# Patient Record
Sex: MALE | Race: WHITE | Employment: PART TIME | URBAN - METROPOLITAN AREA
[De-identification: names, ages, dates, MRNs, and addresses within clinical notes are randomized per-mention and may not be internally consistent; named-entity substitution may affect disease eponyms.]

---

## 2020-03-07 ENCOUNTER — APPOINTMENT (OUTPATIENT)
Dept: GENERAL RADIOLOGY | Age: 55
End: 2020-03-07
Attending: EMERGENCY MEDICINE
Payer: COMMERCIAL

## 2020-03-07 ENCOUNTER — HOSPITAL ENCOUNTER (EMERGENCY)
Age: 55
Discharge: HOME | End: 2020-03-07
Attending: EMERGENCY MEDICINE
Payer: COMMERCIAL

## 2020-03-07 VITALS
HEIGHT: 74 IN | RESPIRATION RATE: 18 BRPM | TEMPERATURE: 98.6 F | HEART RATE: 75 BPM | OXYGEN SATURATION: 100 % | BODY MASS INDEX: 34.77 KG/M2 | WEIGHT: 270.95 LBS | SYSTOLIC BLOOD PRESSURE: 135 MMHG | DIASTOLIC BLOOD PRESSURE: 82 MMHG

## 2020-03-07 DIAGNOSIS — M77.02 MEDIAL EPICONDYLITIS OF LEFT ELBOW: Primary | ICD-10-CM

## 2020-03-07 PROCEDURE — 99283 EMERGENCY DEPT VISIT LOW MDM: CPT

## 2020-03-07 PROCEDURE — 73080 X-RAY EXAM OF ELBOW: CPT

## 2020-03-07 RX ORDER — HYDROMORPHONE HYDROCHLORIDE 4 MG/1
4 TABLET ORAL
COMMUNITY

## 2020-03-07 RX ORDER — WARFARIN SODIUM 5 MG/1
5 TABLET ORAL
COMMUNITY

## 2020-03-07 RX ORDER — CARBIDOPA AND LEVODOPA 25; 100 MG/1; MG/1
2 TABLET ORAL
COMMUNITY

## 2020-03-07 RX ORDER — ALPRAZOLAM 0.25 MG/1
0.75 TABLET ORAL
COMMUNITY

## 2020-03-07 RX ORDER — ESOMEPRAZOLE MAGNESIUM 40 MG/1
40 CAPSULE, DELAYED RELEASE ORAL DAILY
COMMUNITY

## 2020-03-07 RX ORDER — ZOLPIDEM TARTRATE 10 MG/1
20 TABLET ORAL
COMMUNITY

## 2020-03-07 RX ORDER — TIZANIDINE HYDROCHLORIDE 4 MG/1
16 CAPSULE, GELATIN COATED ORAL
COMMUNITY

## 2020-03-08 NOTE — ED PROVIDER NOTES
The patient presents to the emergency department with left elbow pain. He has had pain for a week. He had a ground-level fall, and reports falling onto his left elbow approximately 1 week ago. He is right-handed. Since his fall, he has had pain to the medial elbow. The pain is increased when he tries to pick something up with his left hand. He denies any weakness of his hand or his arm, but states that when he tries to lift something the pain is so bad that he drops it. He has been taking his Dilaudid which she has prescribed for his chronic leg pain with no relief. Pain is currently 10 out of 10, and described as stabbing. He denies any other injuries or complaints. ORTHO: None    The history is provided by the patient and the spouse. Elbow Pain           Past Medical History:   Diagnosis Date    Acid reflux     Baker's cyst     DVT (deep venous thrombosis) (HCC)        Past Surgical History:   Procedure Laterality Date    HX ORTHOPAEDIC      multiple surgeries to his left leg    IR DILATE ESOPH STRICT      IR IVC FILTER           History reviewed. No pertinent family history. Social History     Socioeconomic History    Marital status:      Spouse name: Not on file    Number of children: Not on file    Years of education: Not on file    Highest education level: Not on file   Occupational History    Not on file   Social Needs    Financial resource strain: Not on file    Food insecurity:     Worry: Not on file     Inability: Not on file    Transportation needs:     Medical: Not on file     Non-medical: Not on file   Tobacco Use    Smoking status: Never Smoker    Smokeless tobacco: Never Used   Substance and Sexual Activity    Alcohol use:  Yes     Alcohol/week: 12.0 standard drinks     Types: 12 Cans of beer per week    Drug use: Not on file    Sexual activity: Not on file   Lifestyle    Physical activity:     Days per week: Not on file     Minutes per session: Not on file  Stress: Not on file   Relationships    Social connections:     Talks on phone: Not on file     Gets together: Not on file     Attends Cheondo[employer] service: Not on file     Active member of club or organization: Not on file     Attends meetings of clubs or organizations: Not on file     Relationship status: Not on file    Intimate partner violence:     Fear of current or ex partner: Not on file     Emotionally abused: Not on file     Physically abused: Not on file     Forced sexual activity: Not on file   Other Topics Concern    Not on file   Social History Narrative    Not on file         ALLERGIES: Pcn [penicillins]    Review of Systems   Constitutional: Negative for appetite change and fever. HENT: Negative for congestion, nosebleeds and sore throat. Eyes: Negative for discharge and visual disturbance. Respiratory: Negative for cough and shortness of breath. Cardiovascular: Negative for chest pain. Gastrointestinal: Negative for abdominal pain, diarrhea, nausea and vomiting. Genitourinary: Negative for dysuria. Musculoskeletal:        L elbow pain   Skin: Negative for rash. Neurological: Negative for weakness and headaches. Hematological: Negative for adenopathy. Psychiatric/Behavioral: Negative. All other systems reviewed and are negative. Vitals:    03/07/20 2119 03/07/20 2225   BP: (!) 148/99 135/82   Pulse: 84 75   Resp: 18 18   Temp: 99 °F (37.2 °C) 98.6 °F (37 °C)   SpO2: 100% 100%   Weight: 122.9 kg (270 lb 15.1 oz)    Height: 6' 2\" (1.88 m)             Physical Exam  Vitals signs and nursing note reviewed. Constitutional:       Appearance: Normal appearance. He is well-developed. HENT:      Head: Normocephalic and atraumatic. Right Ear: External ear normal.      Nose: Nose normal.      Mouth/Throat:      Mouth: Mucous membranes are moist.   Eyes:      Conjunctiva/sclera: Conjunctivae normal.      Pupils: Pupils are equal, round, and reactive to light.    Neck: Musculoskeletal: Normal range of motion and neck supple. Cardiovascular:      Rate and Rhythm: Normal rate and regular rhythm. Heart sounds: Normal heart sounds. Pulmonary:      Effort: Pulmonary effort is normal.      Breath sounds: Normal breath sounds. Abdominal:      General: Bowel sounds are normal.      Palpations: Abdomen is soft. Tenderness: There is no abdominal tenderness. Musculoskeletal: Normal range of motion. General: Tenderness present. Comments: L elbow - TTP over the medial elbow. Pain with ROM of the elbow. No pain with pronation or supination of the forearm. Full range of motion of the wrist.  Equal . Neurovascularly intact. Skin:     General: Skin is warm and dry. Capillary Refill: Capillary refill takes less than 2 seconds. Neurological:      General: No focal deficit present. Mental Status: He is alert and oriented to person, place, and time. Psychiatric:         Behavior: Behavior normal.          MDM       Procedures      A/P:  1. Medial epicondylitis - counterforce brace. History of acetaminophen to his already prescribed Dilaudid. Follow-up with orthopedics. He is on Coumadin, and therefore cannot take NSAIDs. Patient's results have been reviewed with them. Patient and/or family have verbally conveyed their understanding and agreement of the patient's signs, symptoms, diagnosis, treatment and prognosis and additionally agree to follow up as recommended or return to the Emergency Room should their condition change prior to follow-up. Discharge instructions have also been provided to the patient with some educational information regarding their diagnosis as well a list of reasons why they would want to return to the ER prior to their follow-up appointment should their condition change.

## 2020-03-08 NOTE — ED TRIAGE NOTES
Triage Note: Patient arrives to ER complaining of left elbow pain. Patient states he fell about a week ago and has had pain to his elbow since then. States he is on dilaudid for chronic leg pain and that has not helped with his elbow pain. Full ROM at elbow but states when he tries to lift something heavy his elbow gives out and he drops it.

## 2020-03-08 NOTE — DISCHARGE INSTRUCTIONS
- Please purchase a counterforce brace or a compression sleeve. - Ice your elbow frequently - especially after activity. - Add Acetaminophen 650 mg every 6 hrs as needed for pain. - Please follow-up with Dr. Lisa Thompson.  - Return to ED for ANY concerns. Patient Education        Golmarge's Elbow: Care Instructions  Your Care Instructions  The pain and soreness in the inner part of your elbow is caused by a problem called golmarge's elbow. Bending the wrist over and over again has hurt the tendons that attach to your inner elbow. The muscles in your forearm also may hurt. Sophy's elbow usually gets better with treatment at home. Follow-up care is a key part of your treatment and safety. Be sure to make and go to all appointments, and call your doctor if you are having problems. It's also a good idea to know your test results and keep a list of the medicines you take. How can you care for yourself at home? · Rest your elbow and wrist. Try to avoid movements that are painful. You may have to do this for weeks to months. Follow your doctor's directions for how long to rest.  · Put ice or a cold pack on your elbow for 10 to 20 minutes at a time. Try to do this every 1 to 2 hours for the next 3 days (when you are awake) or until the swelling goes down. Put a thin cloth between the ice and your skin. · Prop up the sore arm on a pillow when you ice it or anytime you sit or lie down during the next 3 days. Try to keep it above the level of your heart. This will help reduce swelling. · Take pain medicine exactly as directed. ? If the doctor gave you a prescription medicine for pain, take it as prescribed. ? If you are not taking a prescription pain medicine, ask your doctor if you can take an over-the-counter medicine. · If your doctor gave you a brace or splint, use it as directed. A \"counterforce\" brace is a strap around the forearm, just below the elbow.  It may ease the pressure on the tendon and may spread force throughout the arm. · Follow your doctor's or physical therapist's directions for exercise. To prevent golfer's elbow  · After your elbow has healed, learn the best techniques for your work or sport. A physical or occupational therapist can help you. When should you call for help? Call your doctor now or seek immediate medical care if:    · Your pain gets worse.     · You cannot bend your elbow normally.     · You have tingling, weakness, or numbness in your hand and fingers.     · Your arm or hand is cool or pale or changes color.    Watch closely for changes in your health, and be sure to contact your doctor if:    · You have work problems caused by your elbow pain.     · Your pain is not better after 2 weeks. Where can you learn more? Go to http://jermain-benigno.info/. Enter Y420 in the search box to learn more about \"Golfer's Elbow: Care Instructions. \"  Current as of: June 26, 2019  Content Version: 12.2  © 4456-1275 [employer]. Care instructions adapted under license by [employer] (which disclaims liability or warranty for this information). If you have questions about a medical condition or this instruction, always ask your healthcare professional. [address] any warranty or liability for your use of this information. Patient Education        Golfer's Elbow: Exercises  Introduction  Here are some examples of exercises for you to try. The exercises may be suggested for a condition or for rehabilitation. Start each exercise slowly. Ease off the exercises if you start to have pain. You will be told when to start these exercises and which ones will work best for you. How to do the exercises  Wrist extensor stretch    1. Extend your affected arm in front of you and make a fist with your palm facing down. 2. Bend your wrist so that your fist points toward the floor.   3. With your other hand, gently bend your wrist farther until you feel a mild to moderate stretch in your forearm. 4. Hold for at least 15 to 30 seconds. 5. Repeat 2 to 4 times. 6. Repeat steps 1 through 5 with your fingers pointing toward the floor. Forearm extensor stretch    1. Place your affected elbow down at your side, bent at about 90 degrees. Then make a fist with your palm facing down. 2. Keeping your wrist bent, slowly straighten your elbow so your arm is down at your side. Then twist your fist out so your palm is facing out to the side and you feel a stretch. 3. Hold for at least 15 to 30 seconds. 4. Repeat 2 to 4 times. Wrist flexor stretch    1. Extend your affected arm in front of you with your palm facing away from your body. 2. Bend back your wrist, pointing your hand up toward the ceiling. 3. With your other hand, gently bend your wrist farther until you feel a mild to moderate stretch in your forearm. 4. Hold for at least 15 to 30 seconds. 5. Repeat 2 to 4 times. 6. Repeat steps 1 through 5, but this time extend your affected arm in front of you with your palm facing up. Then bend back your wrist, pointing your hand toward the floor. Wrist curls    1. Place your forearm on a table with your hand hanging over the edge of the table, palm up. 2. Place a 1- to 2-pound weight in your hand. This may be a dumbbell, a can of food, or a filled water bottle. 3. Slowly raise and lower the weight while keeping your forearm on the table and your palm facing up. 4. Repeat this motion 8 to 12 times. 5. Switch arms, and do steps 1 through 4.  6. Repeat with your hand facing down toward the floor. Switch arms. Resisted wrist extension    1. Sit leaning forward with your legs slightly spread. Then place your affected forearm on your thigh with your hand and wrist in front of your knee.   2. Grasp one end of an exercise band with your palm down, and step on the other end.  3. Slowly bend your wrist upward for a count of 2, then lower your wrist slowly to a count of 5.  4. Repeat 8 to 12 times. Resisted wrist flexion    1. Sit leaning forward with your legs slightly spread. Then place your affected forearm on your thigh with your hand and wrist in front of your knee. 2. Grasp one end of an exercise band with your palm up, and step on the other end.  3. Slowly bend your wrist upward for a count of 2, then lower your wrist slowly to a count of 5.  4. Repeat 8 to 12 times. Neck stretch to the side    1. This stretch works best if you keep your shoulder down as you lean away from it. To help you remember to do this, start by relaxing your shoulders and lightly holding on to your thighs or your chair. 2. Tilt your head away from your affected elbow and toward your opposite shoulder. For example, if your right elbow is sore, keep your right shoulder down as you lean your head toward your left shoulder. 3. Hold for 15 to 30 seconds. Let the weight of your head stretch your muscles. 4. If you would like a little added stretch, use your hand to gently and steadily pull your head toward your shoulder. For example, if your right elbow is sore, use your left hand to gently pull your head toward your left shoulder. 5. Repeat 2 to 4 times. Resisted forearm pronation    1. Sit leaning forward with your legs slightly spread. Then place your affected forearm on your thigh with your hand and wrist in front of your knee. 2. Grasp one end of an exercise band with your palm up, and step on the other end. 3. Keeping your wrist straight, roll your palm inward toward your thigh for a count of 2, then slowly move your wrist back to the starting position to a count of 5.  4. Repeat 8 to 12 times. Resisted supination    1. Sit leaning forward with your legs slightly spread. Then place your affected forearm on your thigh with your hand and wrist in front of your knee.   2. Grasp one end of an exercise band with your palm down, and step on the other end.  3. Keeping your wrist straight, roll your palm outward and away from your thigh for a count of 2, then slowly move your wrist back to the starting position to a count of 5.  4. Repeat 8 to 12 times. Follow-up care is a key part of your treatment and safety. Be sure to make and go to all appointments, and call your doctor if you are having problems. It's also a good idea to know your test results and keep a list of the medicines you take. Where can you learn more? Go to http://jermain-benigno.info/. Enter [phone] [phone] in the search box to learn more about \"Golfer's Elbow: Exercises. \"  Current as of: June 26, 2019  Content Version: 12.2  © 0681-2912 [employer]. Care instructions adapted under license by [employer] (which disclaims liability or warranty for this information). If you have questions about a medical condition or this instruction, always ask your healthcare professional. [address] any warranty or liability for your use of this information.

## 2020-04-15 ENCOUNTER — APPOINTMENT (OUTPATIENT)
Dept: GENERAL RADIOLOGY | Age: 55
End: 2020-04-15
Attending: EMERGENCY MEDICINE
Payer: COMMERCIAL

## 2020-04-15 ENCOUNTER — APPOINTMENT (OUTPATIENT)
Dept: CT IMAGING | Age: 55
End: 2020-04-15
Attending: EMERGENCY MEDICINE
Payer: COMMERCIAL

## 2020-04-15 ENCOUNTER — HOSPITAL ENCOUNTER (EMERGENCY)
Age: 55
Discharge: HOME | End: 2020-04-15
Attending: EMERGENCY MEDICINE
Payer: COMMERCIAL

## 2020-04-15 VITALS
SYSTOLIC BLOOD PRESSURE: 146 MMHG | WEIGHT: 263.67 LBS | OXYGEN SATURATION: 93 % | TEMPERATURE: 98.1 F | BODY MASS INDEX: 33.84 KG/M2 | RESPIRATION RATE: 16 BRPM | HEART RATE: 79 BPM | DIASTOLIC BLOOD PRESSURE: 83 MMHG | HEIGHT: 74 IN

## 2020-04-15 DIAGNOSIS — J01.40 ACUTE PANSINUSITIS, RECURRENCE NOT SPECIFIED: Primary | ICD-10-CM

## 2020-04-15 DIAGNOSIS — R79.1 SUBTHERAPEUTIC INTERNATIONAL NORMALIZED RATIO (INR): ICD-10-CM

## 2020-04-15 DIAGNOSIS — R53.83 FATIGUE, UNSPECIFIED TYPE: ICD-10-CM

## 2020-04-15 LAB
ALBUMIN SERPL-MCNC: 3.9 G/DL (ref 3.5–5)
ALBUMIN/GLOB SERPL: 1 {RATIO} (ref 1.1–2.2)
ALP SERPL-CCNC: 85 U/L (ref 45–117)
ALT SERPL-CCNC: 23 U/L (ref 12–78)
ANION GAP SERPL CALC-SCNC: 8 MMOL/L (ref 5–15)
AST SERPL-CCNC: 18 U/L (ref 15–37)
ATRIAL RATE: 71 BPM
BASOPHILS # BLD: 0 K/UL (ref 0–0.1)
BASOPHILS NFR BLD: 0 % (ref 0–1)
BILIRUB SERPL-MCNC: 0.4 MG/DL (ref 0.2–1)
BUN SERPL-MCNC: 11 MG/DL (ref 6–20)
BUN/CREAT SERPL: 10 (ref 12–20)
CALCIUM SERPL-MCNC: 8.4 MG/DL (ref 8.5–10.1)
CALCULATED P AXIS, ECG09: 18 DEGREES
CALCULATED R AXIS, ECG10: 31 DEGREES
CALCULATED T AXIS, ECG11: 27 DEGREES
CHLORIDE SERPL-SCNC: 105 MMOL/L (ref 97–108)
CO2 SERPL-SCNC: 30 MMOL/L (ref 21–32)
COMMENT, HOLDF: NORMAL
CREAT SERPL-MCNC: 1.05 MG/DL (ref 0.7–1.3)
DIAGNOSIS, 93000: NORMAL
DIFFERENTIAL METHOD BLD: NORMAL
EOSINOPHIL # BLD: 0.4 K/UL (ref 0–0.4)
EOSINOPHIL NFR BLD: 4 % (ref 0–7)
ERYTHROCYTE [DISTWIDTH] IN BLOOD BY AUTOMATED COUNT: 13.9 % (ref 11.5–14.5)
GLOBULIN SER CALC-MCNC: 4.1 G/DL (ref 2–4)
GLUCOSE SERPL-MCNC: 96 MG/DL (ref 65–100)
HCT VFR BLD AUTO: 43.7 % (ref 36.6–50.3)
HGB BLD-MCNC: 13.4 G/DL (ref 12.1–17)
IMM GRANULOCYTES # BLD AUTO: 0 K/UL (ref 0–0.04)
IMM GRANULOCYTES NFR BLD AUTO: 0 % (ref 0–0.5)
INR PPP: 1.1 (ref 0.9–1.1)
LYMPHOCYTES # BLD: 3.1 K/UL (ref 0.8–3.5)
LYMPHOCYTES NFR BLD: 35 % (ref 12–49)
MAGNESIUM SERPL-MCNC: 2 MG/DL (ref 1.6–2.4)
MCH RBC QN AUTO: 27.4 PG (ref 26–34)
MCHC RBC AUTO-ENTMCNC: 30.7 G/DL (ref 30–36.5)
MCV RBC AUTO: 89.4 FL (ref 80–99)
MONOCYTES # BLD: 0.8 K/UL (ref 0–1)
MONOCYTES NFR BLD: 9 % (ref 5–13)
NEUTS SEG # BLD: 4.6 K/UL (ref 1.8–8)
NEUTS SEG NFR BLD: 52 % (ref 32–75)
NRBC # BLD: 0 K/UL (ref 0–0.01)
NRBC BLD-RTO: 0 PER 100 WBC
P-R INTERVAL, ECG05: 192 MS
PHOSPHATE SERPL-MCNC: 3.1 MG/DL (ref 2.6–4.7)
PLATELET # BLD AUTO: 273 K/UL (ref 150–400)
PMV BLD AUTO: 10 FL (ref 8.9–12.9)
POTASSIUM SERPL-SCNC: 3.8 MMOL/L (ref 3.5–5.1)
PROT SERPL-MCNC: 8 G/DL (ref 6.4–8.2)
PROTHROMBIN TIME: 10.6 SEC (ref 9–11.1)
Q-T INTERVAL, ECG07: 388 MS
QRS DURATION, ECG06: 94 MS
QTC CALCULATION (BEZET), ECG08: 421 MS
RBC # BLD AUTO: 4.89 M/UL (ref 4.1–5.7)
SAMPLES BEING HELD,HOLD: NORMAL
SODIUM SERPL-SCNC: 143 MMOL/L (ref 136–145)
TROPONIN I SERPL-MCNC: <0.05 NG/ML
TSH SERPL DL<=0.05 MIU/L-ACNC: 1.27 UIU/ML (ref 0.36–3.74)
VENTRICULAR RATE, ECG03: 71 BPM
WBC # BLD AUTO: 8.8 K/UL (ref 4.1–11.1)

## 2020-04-15 PROCEDURE — 84100 ASSAY OF PHOSPHORUS: CPT

## 2020-04-15 PROCEDURE — 99284 EMERGENCY DEPT VISIT MOD MDM: CPT

## 2020-04-15 PROCEDURE — 36415 COLL VENOUS BLD VENIPUNCTURE: CPT

## 2020-04-15 PROCEDURE — 85025 COMPLETE CBC W/AUTO DIFF WBC: CPT

## 2020-04-15 PROCEDURE — 85610 PROTHROMBIN TIME: CPT

## 2020-04-15 PROCEDURE — 84484 ASSAY OF TROPONIN QUANT: CPT

## 2020-04-15 PROCEDURE — 74011250637 HC RX REV CODE- 250/637: Performed by: EMERGENCY MEDICINE

## 2020-04-15 PROCEDURE — 74011250636 HC RX REV CODE- 250/636: Performed by: EMERGENCY MEDICINE

## 2020-04-15 PROCEDURE — 83735 ASSAY OF MAGNESIUM: CPT

## 2020-04-15 PROCEDURE — 71046 X-RAY EXAM CHEST 2 VIEWS: CPT

## 2020-04-15 PROCEDURE — 70450 CT HEAD/BRAIN W/O DYE: CPT

## 2020-04-15 PROCEDURE — 80053 COMPREHEN METABOLIC PANEL: CPT

## 2020-04-15 PROCEDURE — 84443 ASSAY THYROID STIM HORMONE: CPT

## 2020-04-15 PROCEDURE — 93005 ELECTROCARDIOGRAM TRACING: CPT

## 2020-04-15 RX ORDER — DOXYCYCLINE HYCLATE 100 MG
100 TABLET ORAL 2 TIMES DAILY
Qty: 20 TAB | Refills: 0 | Status: SHIPPED | OUTPATIENT
Start: 2020-04-15 | End: 2020-04-25

## 2020-04-15 RX ORDER — DOXYCYCLINE HYCLATE 100 MG
100 TABLET ORAL
Status: COMPLETED | OUTPATIENT
Start: 2020-04-15 | End: 2020-04-15

## 2020-04-15 RX ADMIN — SODIUM CHLORIDE 1000 ML: 900 INJECTION, SOLUTION INTRAVENOUS at 00:45

## 2020-04-15 RX ADMIN — DOXYCYCLINE HYCLATE 100 MG: 100 TABLET, COATED ORAL at 01:25

## 2020-04-15 NOTE — ED TRIAGE NOTES
Pt claims he has been feels tired. Falling asleep while talking. Also c/o of feeling agitated. Working every day on farm. Speech clear. Nad. Claims he felt this way a long time ago and had lyme's disease. Gait steady. Nad. Pt also added he is having difficulties sleeping at night because of his anxiety. Also claims he had similar s/s with a dx of pneumonia several years ago. Chronic use of rx dilaudid for leg injury 2006.

## 2020-04-15 NOTE — DISCHARGE INSTRUCTIONS
- Doxycycline as prescribed. - Sinus rinses twice a day. - Please begin nasal Flonase and Allegra daily.  - Follow-up with Dr. Dayami Rene - ENT. - Please call Dr. Roya Heller office first thing in the AM. Let him know your INR (ie Coumadin) level was only 1.1. Adjust your Coumadin dose as advised. - Return to ED for any concerns. Patient Education        Sinusitis: Care Instructions  Your Care Instructions    Sinusitis is an infection of the lining of the sinus cavities in your head. Sinusitis often follows a cold. It causes pain and pressure in your head and face. In most cases, sinusitis gets better on its own in 1 to 2 weeks. But some mild symptoms may last for several weeks. Sometimes antibiotics are needed. Follow-up care is a key part of your treatment and safety. Be sure to make and go to all appointments, and call your doctor if you are having problems. It's also a good idea to know your test results and keep a list of the medicines you take. How can you care for yourself at home? · Take an over-the-counter pain medicine, such as acetaminophen (Tylenol), ibuprofen (Advil, Motrin), or naproxen (Aleve). Read and follow all instructions on the label. · If the doctor prescribed antibiotics, take them as directed. Do not stop taking them just because you feel better. You need to take the full course of antibiotics. · Be careful when taking over-the-counter cold or flu medicines and Tylenol at the same time. Many of these medicines have acetaminophen, which is Tylenol. Read the labels to make sure that you are not taking more than the recommended dose. Too much acetaminophen (Tylenol) can be harmful. · Breathe warm, moist air from a steamy shower, a hot bath, or a sink filled with hot water. Avoid cold, dry air. Using a humidifier in your home may help. Follow the directions for cleaning the machine. · Use saline (saltwater) nasal washes to help keep your nasal passages open and wash out mucus and bacteria. You can buy saline nose drops at a grocery store or drugstore. Or you can make your own at home by adding 1 teaspoon of salt and 1 teaspoon of baking soda to 2 cups of distilled water. If you make your own, fill a bulb syringe with the solution, insert the tip into your nostril, and squeeze gently. Ahmed Hamburger your nose. · Put a hot, wet towel or a warm gel pack on your face 3 or 4 times a day for 5 to 10 minutes each time. · Try a decongestant nasal spray like oxymetazoline (Afrin). Do not use it for more than 3 days in a row. Using it for more than 3 days can make your congestion worse. When should you call for help? Call your doctor now or seek immediate medical care if:    · You have new or worse swelling or redness in your face or around your eyes.     · You have a new or higher fever.    Watch closely for changes in your health, and be sure to contact your doctor if:    · You have new or worse facial pain.     · The mucus from your nose becomes thicker (like pus) or has new blood in it.     · You are not getting better as expected. Where can you learn more? Go to http://jermain-benigno.info/  Enter K841 in the search box to learn more about \"Sinusitis: Care Instructions. \"  Current as of: July 28, 2019Content Version: 12.4  © 2546-5402 [employer]. Care instructions adapted under license by [employer] (which disclaims liability or warranty for this information). If you have questions about a medical condition or this instruction, always ask your healthcare professional. [place] [address] any warranty or liability for your use of this information. Patient Education        Saline Nasal Washes: Care Instructions  Your Care Instructions  Saline nasal washes help keep the nasal passages open by washing out thick or dried mucus. This simple remedy can help relieve symptoms of allergies, sinusitis, and colds.  It also can make the nose feel more comfortable by keeping the mucous membranes moist. You may notice a little burning sensation in your nose the first few times you use the solution, but this usually gets better in a few days. Follow-up care is a key part of your treatment and safety. Be sure to make and go to all appointments, and call your doctor if you are having problems. It's also a good idea to know your test results and keep a list of the medicines you take. How can you care for yourself at home? · You can buy premixed saline solution in a squeeze bottle or other sinus rinse products at a drugstore. Read and follow the instructions on the label. · You also can make your own saline solution by adding 1 teaspoon of salt and 1 teaspoon of baking soda to 2 cups of distilled water. · If you use a homemade solution, pour a small amount into a clean bowl. Using a rubber bulb syringe, squeeze the syringe and place the tip in the salt water. Pull a small amount of the salt water into the syringe by relaxing your hand. · Sit down with your head tilted slightly back. Do not lie down. Put the tip of the bulb syringe or the squeeze bottle a little way into one of your nostrils. Gently drip or squirt a few drops into the nostril. Repeat with the other nostril. Some sneezing and gagging are normal at first.  · Gently blow your nose. · Wipe the syringe or bottle tip clean after each use. · Repeat this 2 or 3 times a day. · Use nasal washes gently if you have nosebleeds often. When should you call for help? Watch closely for changes in your health, and be sure to contact your doctor if:    · You often get nosebleeds.     · You have problems doing the nasal washes. Where can you learn more? Go to http://jermain-benigno.info/  Enter B784 in the search box to learn more about \"Saline Nasal Washes: Care Instructions. \"  Current as of: July 28, 2019Content Version: 12.4  © 6616-7683 [employer].   Care instructions adapted under license by [employer] (which disclaims liability or warranty for this information). If you have questions about a medical condition or this instruction, always ask your healthcare professional. [address] any warranty or liability for your use of this information. Patient Education        Fatigue: Care Instructions  Your Care Instructions    Fatigue is a feeling of tiredness, exhaustion, or lack of energy. You may feel fatigue because of too much or not enough activity. It can also come from stress, lack of sleep, boredom, and poor diet. Many medical problems, such as viral infections, can cause fatigue. Emotional problems, especially depression, are often the cause of fatigue. Fatigue is most often a symptom of another problem. Treatment for fatigue depends on the cause. For example, if you have fatigue because you have a certain health problem, treating this problem also treats your fatigue. If depression or anxiety is the cause, treatment may help. Follow-up care is a key part of your treatment and safety. Be sure to make and go to all appointments, and call your doctor if you are having problems. It's also a good idea to know your test results and keep a list of the medicines you take. How can you care for yourself at home? · Get regular exercise. But don't overdo it. Go back and forth between rest and exercise. · Get plenty of rest.  · Eat a healthy diet. Do not skip meals, especially breakfast.  · Reduce your use of caffeine, tobacco, and alcohol. Caffeine is most often found in coffee, tea, cola drinks, and chocolate. · Limit medicines that can cause fatigue. This includes tranquilizers and cold and allergy medicines. When should you call for help?   Watch closely for changes in your health, and be sure to contact your doctor if:    · You have new symptoms such as fever or a rash.     · Your fatigue gets worse.     · You have been feeling down, depressed, or hopeless. Or you may have lost interest in things that you usually enjoy.     · You are not getting better as expected. Where can you learn more? Go to http://jermain-benigno.info/  Enter [id number] in the search box to learn more about \"Fatigue: Care Instructions. \"  Current as of: June 26, 2019Content Version: 12.4  © 4486-6298 [employer]. Care instructions adapted under license by [employer] (which disclaims liability or warranty for this information). If you have questions about a medical condition or this instruction, always ask your healthcare professional. [place] [address] any warranty or liability for your use of this information.

## 2020-04-15 NOTE — ED NOTES
Orthostatics negative, patient denies dizziness. Patient ambulated with RN and radiology tech to xray with portable pulse ox, 02 sat 96-98% RA, HR noted to be 120's, MD aware.

## 2020-04-15 NOTE — ED NOTES
Patient  given copy of dc instructions  Patient  verbalized understanding of the importance of discussing medications with  his or her physician or clinic they will be following up with. Patient alert and oriented and in no acute distress. Patient discharged home ambulatory with family member.

## 2020-04-15 NOTE — ED PROVIDER NOTES
Patient presents to the emergency department with multiple concerns. He began to feel ill 2 nights ago. He reports having some confusion. He also reports he has had multiple episodes of when he stands up when he is already tired he falls asleep. He has fallen down during these episodes. He denies hitting his head. He has not had any fever. He feels that the symptoms may be related to mold spores on the farm. He works on a cattle farm. He reports nasal congestion. He denies any headache. He has had occasional shortness of breath when he is working, but the symptoms are not new. He denies any chest pain. He has no nausea, vomiting, diarrhea, or abdominal pain. He denies any difficulty speaking or swallowing he denies any known sick contacts. He has no other concerns or complaints. The history is provided by the patient. Physical   Pertinent negatives include no chest pain, no abdominal pain, no headaches and no shortness of breath. Past Medical History:   Diagnosis Date    Acid reflux     Anxiety     Baker's cyst     DVT (deep venous thrombosis) (HCC)     Restless leg     Restless leg syndrome        Past Surgical History:   Procedure Laterality Date    HX ORTHOPAEDIC      multiple surgeries to his left leg    IR DILATE ESOPH STRICT      IR IVC FILTER           History reviewed. No pertinent family history.     Social History     Socioeconomic History    Marital status:      Spouse name: Not on file    Number of children: Not on file    Years of education: Not on file    Highest education level: Not on file   Occupational History    Not on file   Social Needs    Financial resource strain: Not on file    Food insecurity     Worry: Not on file     Inability: Not on file    Transportation needs     Medical: Not on file     Non-medical: Not on file   Tobacco Use    Smoking status: Never Smoker    Smokeless tobacco: Never Used   Substance and Sexual Activity    Alcohol use: Yes     Alcohol/week: 12.0 standard drinks     Types: 12 Cans of beer per week    Drug use: Never    Sexual activity: Not on file   Lifestyle    Physical activity     Days per week: Not on file     Minutes per session: Not on file    Stress: Not on file   Relationships    Social connections     Talks on phone: Not on file     Gets together: Not on file     Attends Restorationism service: Not on file     Active member of club or organization: Not on file     Attends meetings of clubs or organizations: Not on file     Relationship status: Not on file    Intimate partner violence     Fear of current or ex partner: Not on file     Emotionally abused: Not on file     Physically abused: Not on file     Forced sexual activity: Not on file   Other Topics Concern    Not on file   Social History Narrative    Not on file         ALLERGIES: Pcn [penicillins]    Review of Systems   Constitutional: Positive for fatigue. Negative for appetite change and fever. HENT: Positive for congestion. Negative for nosebleeds and sore throat. Eyes: Negative for discharge. Respiratory: Negative for cough and shortness of breath. Cardiovascular: Negative for chest pain. Gastrointestinal: Negative for abdominal pain, diarrhea, nausea and vomiting. Genitourinary: Negative for dysuria. Musculoskeletal: Negative. Skin: Negative for rash. Allergic/Immunologic: Negative for immunocompromised state. Neurological: Positive for weakness. Negative for headaches. Hematological: Negative for adenopathy. Psychiatric/Behavioral: Negative. All other systems reviewed and are negative. Vitals:    04/15/20 0003 04/15/20 0048 04/15/20 0100   BP: (!) 178/99 (!) 169/94 146/83   Pulse: 79     Resp: 16     Temp: 98.1 °F (36.7 °C)     SpO2: 100%  93%   Weight: 119.6 kg (263 lb 10.7 oz)     Height: 6' 2\" (1.88 m)              Physical Exam  Vitals signs and nursing note reviewed.    Constitutional:       Appearance: Normal appearance. He is well-developed. HENT:      Head: Normocephalic and atraumatic. Right Ear: External ear normal.      Mouth/Throat:      Mouth: Mucous membranes are moist.   Eyes:      Conjunctiva/sclera: Conjunctivae normal.      Pupils: Pupils are equal, round, and reactive to light. Neck:      Musculoskeletal: Normal range of motion and neck supple. Cardiovascular:      Rate and Rhythm: Normal rate and regular rhythm. Pulses: Normal pulses. Heart sounds: Normal heart sounds. Pulmonary:      Effort: Pulmonary effort is normal.      Breath sounds: Normal breath sounds. Abdominal:      General: Abdomen is flat. Bowel sounds are normal.      Palpations: Abdomen is soft. Tenderness: There is no abdominal tenderness. Musculoskeletal: Normal range of motion. General: No tenderness. Skin:     General: Skin is warm and dry. Capillary Refill: Capillary refill takes less than 2 seconds. Neurological:      General: No focal deficit present. Mental Status: He is alert and oriented to person, place, and time. Cranial Nerves: No cranial nerve deficit. Sensory: No sensory deficit. Motor: No weakness. Gait: Gait normal.      Deep Tendon Reflexes: Abnormal reflex: .dx. Psychiatric:         Mood and Affect: Mood normal.         Behavior: Behavior normal.          MDM       Procedures    ED EKG interpretation:  Rhythm: normal sinus rhythm; and regular . Rate (approx.): 70; Axis: normal; P wave: normal; QRS interval: normal ; ST/T wave: normal; interpreted by Mario Samuels MD    Encounter Diagnoses     ICD-10-CM ICD-9-CM   1. Acute pansinusitis, recurrence not specified J01.40 461.8   2. Fatigue, unspecified type R53.83 780.79   3. Subtherapeutic international normalized ratio (INR) R79.1 790.92     A/P:  1. Sinusitis - Doxycycline. Begin rinses, Allegra, and Flonase. Up with ENT if not improved. 2. Fatigue - suspect related to sinusitis  3. Subtherapeutic INR -the patient states that he has not taken his Coumadin yesterday or today. Advised to resume his Coumadin and to contact his PCP in the morning. Patient's results have been reviewed with them. Patient and/or family have verbally conveyed their understanding and agreement of the patient's signs, symptoms, diagnosis, treatment and prognosis and additionally agree to follow up as recommended or return to the Emergency Room should their condition change prior to follow-up. Discharge instructions have also been provided to the patient with some educational information regarding their diagnosis as well a list of reasons why they would want to return to the ER prior to their follow-up appointment should their condition change.

## 2020-04-15 NOTE — LETTER
[facility] EMERGENCY [facility] 
[address] [address] [phone] Work/School Note Date: 4/14/2020 To Whom It May concern: 
 
Jessica Brand was seen and treated today in the emergency room by the following provider(s): 
Attending Provider: Warren Edward MD. Jessica Brand may return to work on 4/20/20.  
 
Sincerely, 
 
 
 
 
Judge Julia MD

## 2020-09-02 ENCOUNTER — HOSPITAL ENCOUNTER (EMERGENCY)
Age: 55
Discharge: HOME | End: 2020-09-02
Attending: EMERGENCY MEDICINE
Payer: COMMERCIAL

## 2020-09-02 ENCOUNTER — APPOINTMENT (OUTPATIENT)
Dept: GENERAL RADIOLOGY | Age: 55
End: 2020-09-02
Attending: EMERGENCY MEDICINE
Payer: COMMERCIAL

## 2020-09-02 ENCOUNTER — APPOINTMENT (OUTPATIENT)
Dept: CT IMAGING | Age: 55
End: 2020-09-02
Attending: EMERGENCY MEDICINE
Payer: COMMERCIAL

## 2020-09-02 VITALS
WEIGHT: 255.95 LBS | SYSTOLIC BLOOD PRESSURE: 174 MMHG | HEART RATE: 89 BPM | BODY MASS INDEX: 32.86 KG/M2 | TEMPERATURE: 98.3 F | RESPIRATION RATE: 16 BRPM | OXYGEN SATURATION: 99 % | DIASTOLIC BLOOD PRESSURE: 100 MMHG

## 2020-09-02 DIAGNOSIS — S00.33XA CONTUSION OF NOSE, INITIAL ENCOUNTER: Primary | ICD-10-CM

## 2020-09-02 DIAGNOSIS — S06.0X1A CONCUSSION WITH LOSS OF CONSCIOUSNESS OF 30 MINUTES OR LESS, INITIAL ENCOUNTER: ICD-10-CM

## 2020-09-02 LAB — INR BLD: 1 (ref 0.9–1.2)

## 2020-09-02 PROCEDURE — 72050 X-RAY EXAM NECK SPINE 4/5VWS: CPT

## 2020-09-02 PROCEDURE — 99284 EMERGENCY DEPT VISIT MOD MDM: CPT

## 2020-09-02 PROCEDURE — 70450 CT HEAD/BRAIN W/O DYE: CPT

## 2020-09-02 PROCEDURE — 85610 PROTHROMBIN TIME: CPT

## 2020-09-02 RX ORDER — CEPHALEXIN 250 MG/1
500 CAPSULE ORAL 3 TIMES DAILY
COMMUNITY
End: 2021-05-20

## 2020-09-02 NOTE — LETTER
Niya Margarita was seen and treated in our emergency department on 9/2/20 He may return to work on 9/7/20. Thank you, Nichelle Mariscal

## 2020-09-02 NOTE — LETTER
[facility] EMERGENCY [address] [place] [address] [facility] [address] 
[phone] Work/School Note Date: 9/2/2020 To Whom It May concern: 
 
Dena Reyez was seen and treated today in the emergency room by the following provider(s): 
Attending Provider: Mary Coppola MD. Dena Reyez may return to work on 9/5/2020. Sincerely, Escobar Lucia MD

## 2020-09-03 NOTE — DISCHARGE INSTRUCTIONS
Patient Education        Concussion: Care Instructions  Your Care Instructions     A concussion is a kind of injury to the brain. It happens when the head receives a hard blow. The impact can jar or shake the brain against the skull. This interrupts the brain's normal activities. Although you may have cuts or bruises on your head or face, you may have no other visible signs of a brain injury. In most cases, damage to the brain from a concussion can't be seen in tests such as a CT or MRI scan. For a few weeks, you may have low energy, dizziness, trouble sleeping, a headache, ringing in your ears, or nausea. You may also feel anxious, grumpy, or depressed. You may have problems with memory and concentration. These symptoms are common after a concussion. They should slowly improve over time. Sometimes this takes weeks or even months. Someone who lives with you should know how to care for you. Please share this and all information with a caregiver who will be available to help if needed. Follow-up care is a key part of your treatment and safety. Be sure to make and go to all appointments, and call your doctor if you are having problems. It's also a good idea to know your test results and keep a list of the medicines you take. How can you care for yourself at home? Pain control  · Put ice or a cold pack on the part of your head that hurts for 10 to 20 minutes at a time. Put a thin cloth between the ice and your skin. · Be safe with medicines. Read and follow all instructions on the label. ? If the doctor gave you a prescription medicine for pain, take it as prescribed. ? If you are not taking a prescription pain medicine, ask your doctor if you can take an over-the-counter medicine. Recovery  · Follow your doctor's instructions. He or she will tell you if you need someone to watch you closely for the next 24 hours or longer. · Rest is the best way to recover from a concussion.  You need to rest your body and your brain:  ? Get plenty of sleep at night. And take rest breaks during the day. ? Avoid activities that take a lot of physical or mental work. This includes housework, exercise, schoolwork, video games, text messaging, and using the computer. ? You may need to change your school or work schedule while you recover. ? Return to your normal activities slowly. Do not try to do too much at once. · Do not drink alcohol or use illegal drugs. Alcohol and illegal drugs can slow your recovery. And they can increase your risk of a second brain injury. · Avoid activities that could lead to another concussion. Follow your doctor's instructions for a gradual return to activity and sports. · Ask your doctor when it's okay for you to drive a car, ride a bike, or operate machinery. How should you return to activity? Your return to activity can begin after 1 to 2 days of physical and mental rest. After resting, you can gradually increase your activity as long as it does not cause new symptoms or worsen your symptoms. Doctors and concussion specialists suggest steps to follow for returning to sports after a concussion. Use these steps as a guide. You should slowly progress through the following levels of activity:  1. Limited activity. You can take part in daily activities as long as the activity doesn't increase your symptoms or cause new symptoms. 2. Light aerobic activity. This can include walking, swimming, or other exercise at less than 70% of maximum heart rate. No resistance training is included in this step. 3. Sport-specific exercise. This includes running drills or skating drills (depending on the sport), but no head impact. 4. Noncontact training drills. This includes more complex training drills such as passing. The athlete may also begin light resistance training. 5. Full-contact practice. The athlete can participate in normal training. 6. Return to normal game play.  This is the final step and allows the athlete to join in normal game play. Watch and keep track of your progress. It should take at least 6 days for you to go from light activity to normal game play. Make sure that you can stay at each new level of activity for at least 24 hours without symptoms, or as long as your doctor says, before doing more. If one or more symptoms come back, return to a lower level of activity for at least 24 hours. Don't move on until all symptoms are gone. When should you call for help? [id number] anytime you think you may need emergency care. For example, call if:  · You have a seizure. · You passed out (lost consciousness). · You are confused or can't stay awake. Call your doctor now or seek immediate medical care if:  · You have new or worse vomiting. · You feel less alert. · You have new weakness or numbness in any part of your body. Watch closely for changes in your health, and be sure to contact your doctor if:  · You do not get better as expected. · You have new symptoms, such as headaches, trouble concentrating, or changes in mood. Where can you learn more? Go to http://jermainIoterabenigno.info/  Enter X368 in the search box to learn more about \"Concussion: Care Instructions. \"  Current as of: November 20, 2019               Content Version: 12.5  © 0512-3538 [employer]. Care instructions adapted under license by [employer] (which disclaims liability or warranty for this information). If you have questions about a medical condition or this instruction, always ask your healthcare professional. [place] [address] any warranty or liability for your use of this information. Patient Education        Bruised Nose: Care Instructions  Your Care Instructions     You can get a bruised nose if you fall or if something hits your nose. The medical term for a bruise is \"contusion. \" Small blood vessels get torn and leak blood under the skin.   Most people think of a bruise as a black-and-blue spot. But bones and muscles can also get bruised. This may damage deep tissues but not cause a bruise you can see. Your doctor will examine you and will gently press on your nose and face to find areas that are tender. He or she will check your eyes, how well you can move the muscles near the bruise, and your feeling around the area to make sure there isn't a more serious injury, such as a broken bone or nerve damage. You may have tests, including X-rays or other imaging tests like a CT scan. Sometimes it can be hard to tell if a nose is broken or just bruised. The symptoms may be the same. And a broken bone can't always be seen on an X-ray. But the treatment for a bruised nose is often the same as for a broken nose. A bruised nose may cause pain and swelling of the nose and face. But if there is no other damage, it will usually get better in a few weeks with home treatment. Follow-up care is a key part of your treatment and safety. Be sure to make and go to all appointments, and call your doctor if you are having problems. It's also a good idea to know your test results and keep a list of the medicines you take. How can you care for yourself at home? · Put ice or a cold pack on your nose for 10 to 20 minutes at a time. Put a thin cloth between the ice pack and your skin. Try to do this every 1 to 2 hours for the first 3 days (when you are awake) or until the swelling goes down. · Sleep with your head slightly raised until the swelling goes down. Prop up your head and shoulders on pillows. · If you play contact sports, ask your doctor when it's okay to play again. It's safest to wait at least 6 weeks. · Be safe with medicines. Read and follow all instructions on the label. ? If the doctor gave you a prescription medicine for pain, take it as prescribed. ? If you are not taking a prescription pain medicine, ask your doctor if you can take an over-the-counter medicine.   When should you call for help? Call your doctor now or seek immediate medical care if:  · Your pain gets worse. · You have new or worse swelling. · You have new or worse bleeding. · The area near the bruise is tingly, weak, or numb. · You have vision changes. · You have clear fluid draining from your nose. Watch closely for changes in your health, and be sure to contact your doctor if:  · You do not get better as expected. Where can you learn more? Go to http://www.gray.com/  Enter [id number] in the search box to learn more about \"Bruised Nose: Care Instructions. \"  Current as of: June 26, 2019               Content Version: 12.5  © 0072-8917 [employer]. Care instructions adapted under license by [employer] (which disclaims liability or warranty for this information). If you have questions about a medical condition or this instruction, always ask your healthcare professional. [address] any warranty or liability for your use of this information.

## 2020-09-03 NOTE — ED TRIAGE NOTES
Triage note:c/o head injury yesterday at 1830 pm positive loc yesterday. hit face on bushhog.have been off coumidin for 9 days for dental work.has headache with nausea and dizziness.

## 2020-09-03 NOTE — ED PROVIDER NOTES
Fede Alegria is a 53 yo M with h/o DVT on warfarin until 9 days ago until it was held for oral surgery which is scheduled for tomorrow. He states that yesterday he walked into a piece of stationary farm equipment striking himself in the nose, and falling backwards onto the ground. He states that since then he has had headache, nausea and lightheadedness. Past Medical History:   Diagnosis Date    Acid reflux     Anxiety     Baker's cyst     DVT (deep venous thrombosis) (HCC)     Restless leg     Restless leg syndrome        Past Surgical History:   Procedure Laterality Date    HX ORTHOPAEDIC      multiple surgeries to his left leg    IR DILATE ESOPH STRICT      IR IVC FILTER           History reviewed. No pertinent family history. Social History     Socioeconomic History    Marital status:      Spouse name: Not on file    Number of children: Not on file    Years of education: Not on file    Highest education level: Not on file   Occupational History    Not on file   Social Needs    Financial resource strain: Not on file    Food insecurity     Worry: Not on file     Inability: Not on file    Transportation needs     Medical: Not on file     Non-medical: Not on file   Tobacco Use    Smoking status: Never Smoker    Smokeless tobacco: Never Used   Substance and Sexual Activity    Alcohol use:  Yes     Alcohol/week: 12.0 standard drinks     Types: 12 Cans of beer per week    Drug use: Never    Sexual activity: Not on file   Lifestyle    Physical activity     Days per week: Not on file     Minutes per session: Not on file    Stress: Not on file   Relationships    Social connections     Talks on phone: Not on file     Gets together: Not on file     Attends Buddhism service: Not on file     Active member of club or organization: Not on file     Attends meetings of clubs or organizations: Not on file     Relationship status: Not on file    Intimate partner violence     Fear of current or ex partner: Not on file     Emotionally abused: Not on file     Physically abused: Not on file     Forced sexual activity: Not on file   Other Topics Concern    Not on file   Social History Narrative    Not on file         ALLERGIES: Pcn [penicillins]    Review of Systems   Constitutional: Negative for fever. HENT: Negative for sore throat. Eyes: Negative for visual disturbance. Respiratory: Negative for cough. Cardiovascular: Negative for chest pain. Gastrointestinal: Positive for nausea. Negative for abdominal pain. Genitourinary: Negative for dysuria. Musculoskeletal: Positive for myalgias. Negative for back pain. Skin: Negative for rash. Neurological: Positive for light-headedness and headaches. Vitals:    09/02/20 2007   Temp: 98.3 °F (36.8 °C)   Weight: 116.1 kg (255 lb 15.3 oz)            Physical Exam  Vitals signs and nursing note reviewed. Constitutional:       General: He is not in acute distress. Appearance: He is well-developed. HENT:      Head: Normocephalic and atraumatic. Eyes:      Extraocular Movements: Extraocular movements intact. Conjunctiva/sclera: Conjunctivae normal.      Pupils: Pupils are equal, round, and reactive to light. Neck:      Musculoskeletal: Normal range of motion. No neck rigidity or muscular tenderness. Trachea: Phonation normal.   Cardiovascular:      Rate and Rhythm: Normal rate. Pulmonary:      Effort: Pulmonary effort is normal. No respiratory distress. Breath sounds: No wheezing. Abdominal:      General: There is no distension. Tenderness: There is no abdominal tenderness. Musculoskeletal: Normal range of motion. General: No tenderness. Skin:     General: Skin is warm and dry. Neurological:      General: No focal deficit present. Mental Status: He is alert. He is not disoriented. Cranial Nerves: No cranial nerve deficit. Sensory: No sensory deficit.       Motor: No abnormal muscle tone. Gait: Gait normal.          MDM     Nasal contusion. Concussion. CT head and C spine normal.  INR normal. Patient discharged home with concussion precautions and work note.   Procedures

## 2021-03-26 ENCOUNTER — APPOINTMENT (OUTPATIENT)
Dept: GENERAL RADIOLOGY | Age: 56
End: 2021-03-26
Attending: EMERGENCY MEDICINE
Payer: COMMERCIAL

## 2021-03-26 ENCOUNTER — HOSPITAL ENCOUNTER (EMERGENCY)
Age: 56
Discharge: HOME | End: 2021-03-27
Attending: EMERGENCY MEDICINE
Payer: COMMERCIAL

## 2021-03-26 VITALS
TEMPERATURE: 98.4 F | DIASTOLIC BLOOD PRESSURE: 83 MMHG | SYSTOLIC BLOOD PRESSURE: 169 MMHG | HEART RATE: 67 BPM | OXYGEN SATURATION: 98 % | BODY MASS INDEX: 33.34 KG/M2 | RESPIRATION RATE: 18 BRPM | WEIGHT: 259.7 LBS

## 2021-03-26 DIAGNOSIS — S61.220A LACERATION OF RIGHT INDEX FINGER WITH FOREIGN BODY WITHOUT DAMAGE TO NAIL, INITIAL ENCOUNTER: Primary | ICD-10-CM

## 2021-03-26 PROCEDURE — 74011000250 HC RX REV CODE- 250: Performed by: EMERGENCY MEDICINE

## 2021-03-26 PROCEDURE — 90715 TDAP VACCINE 7 YRS/> IM: CPT | Performed by: EMERGENCY MEDICINE

## 2021-03-26 PROCEDURE — 99282 EMERGENCY DEPT VISIT SF MDM: CPT

## 2021-03-26 PROCEDURE — 73140 X-RAY EXAM OF FINGER(S): CPT

## 2021-03-26 PROCEDURE — 74011250636 HC RX REV CODE- 250/636: Performed by: EMERGENCY MEDICINE

## 2021-03-26 PROCEDURE — 90471 IMMUNIZATION ADMIN: CPT

## 2021-03-26 PROCEDURE — 75810000293 HC SIMP/SUPERF WND  RPR

## 2021-03-26 RX ORDER — LIDOCAINE HYDROCHLORIDE 10 MG/ML
10 INJECTION, SOLUTION EPIDURAL; INFILTRATION; INTRACAUDAL; PERINEURAL ONCE
Status: COMPLETED | OUTPATIENT
Start: 2021-03-26 | End: 2021-03-26

## 2021-03-26 RX ADMIN — LIDOCAINE HYDROCHLORIDE 10 ML: 10 INJECTION, SOLUTION EPIDURAL; INFILTRATION; INTRACAUDAL; PERINEURAL at 22:55

## 2021-03-26 RX ADMIN — TETANUS TOXOID, REDUCED DIPHTHERIA TOXOID AND ACELLULAR PERTUSSIS VACCINE, ADSORBED 0.5 ML: 5; 2.5; 8; 8; 2.5 SUSPENSION INTRAMUSCULAR at 22:55

## 2021-03-27 NOTE — ED TRIAGE NOTES
Pt dropped freezer around 1800 today. Pt has a laceration and pain on the right index finger. Good PMS. No jewelry on finger/hand.

## 2021-03-27 NOTE — ED PROVIDER NOTES
Mr. Maris Corbin is a 51yo male who presents to the ER with complaints of laceration pain to his right index finger. He said approximately 6 PM today, he was moving a heavy freezer. He got pinched between the freezer and another object. He clearly saw laceration, but lost his finger and is well. He had a putting hydrogen peroxide on the wound later. He reports some pain at this area. He denies fevers or chills or other injury. He said that he is not sure his last tetanus shot was. Past Medical History:   Diagnosis Date    Acid reflux     Anxiety     Baker's cyst     DVT (deep venous thrombosis) (HCC)     Restless leg     Restless leg syndrome        Past Surgical History:   Procedure Laterality Date    HX ORTHOPAEDIC      multiple surgeries to his left leg    IR DILATE ESOPH STRICT      IR IVC FILTER           History reviewed. No pertinent family history. Social History     Socioeconomic History    Marital status:      Spouse name: Not on file    Number of children: Not on file    Years of education: Not on file    Highest education level: Not on file   Occupational History    Not on file   Social Needs    Financial resource strain: Not on file    Food insecurity     Worry: Not on file     Inability: Not on file    Transportation needs     Medical: Not on file     Non-medical: Not on file   Tobacco Use    Smoking status: Never Smoker    Smokeless tobacco: Never Used   Substance and Sexual Activity    Alcohol use:  Yes     Alcohol/week: 12.0 standard drinks     Types: 12 Cans of beer per week    Drug use: Never    Sexual activity: Not on file   Lifestyle    Physical activity     Days per week: Not on file     Minutes per session: Not on file    Stress: Not on file   Relationships    Social connections     Talks on phone: Not on file     Gets together: Not on file     Attends Yarsani service: Not on file     Active member of club or organization: Not on file     Attends meetings of clubs or organizations: Not on file     Relationship status: Not on file    Intimate partner violence     Fear of current or ex partner: Not on file     Emotionally abused: Not on file     Physically abused: Not on file     Forced sexual activity: Not on file   Other Topics Concern    Not on file   Social History Narrative    Not on file         ALLERGIES: Pcn [penicillins]    Review of Systems   Constitutional: Negative for chills and fever. HENT: Negative for rhinorrhea and sore throat. Respiratory: Negative for cough and shortness of breath. Cardiovascular: Negative for chest pain. Gastrointestinal: Negative for abdominal pain, diarrhea, nausea and vomiting. Genitourinary: Negative for dysuria and hematuria. Musculoskeletal:        Finger pain   Skin: Positive for wound. Negative for pallor and rash. Neurological: Negative for dizziness, weakness and light-headedness. All other systems reviewed and are negative. Vitals:    03/26/21 2217   BP: (!) 169/83   Pulse: 67   Resp: 18   Temp: 98.4 °F (36.9 °C)   SpO2: 98%   Weight: 117.8 kg (259 lb 11.2 oz)            Physical Exam     Vital signs reviewed. Nursing notes reviewed.     Const:  No acute distress, well developed, well nourished  Head:  Atraumatic, normocephalic  Eyes:  PERRL, conjunctiva normal, no scleral icterus  Neck:  Supple, trachea midline  Cardiovascular: regular rate  Resp:  No resp distress, no increased work of breathing  Abd:  Soft, non-tender, non-distended  MSK: Full range of motion of the right index finger  Neuro:  Alert and oriented x3, no cranial nerve defect  Skin: C shaped laceration to the dorsal aspect of the right DIP  Psych: normal mood and affect, behavior is normal, judgement and thought content is normal          MDM  Number of Diagnoses or Management Options     Amount and/or Complexity of Data Reviewed  Tests in the radiology section of CPT®: reviewed and ordered  Review and summarize past medical records: yes    Patient Progress  Patient progress: stable          Mr. Wali Gardner is a 49yo male who presents to the ER with complaints of a laceration to his index finger. I have sutured his wound. NO fx.  Pt. Is already on clindamycin for a dental infection. Pt. To f/u with his PCP or return to the ER with new or worsening sx. Wound Repair    Date/Time: 3/26/2021 11:28 PM  Performed by: attendingPreparation: skin prepped with alcohol  Pre-procedure re-eval: Immediately prior to the procedure, the patient was reevaluated and found suitable for the planned procedure and any planned medications. Time out: Immediately prior to the procedure a time out was called to verify the correct patient, procedure, equipment, staff and marking as appropriate. .  Location details: right index finger  Wound length:2.6 - 7.5 cm  Anesthesia: digital block    Anesthesia:  Local Anesthetic: lidocaine 1% without epinephrine  Foreign bodies: unknown (Small amount of debris/dirt)  Irrigation solution: tap water  Debridement: none  Skin closure: 5-0 nylon  Number of sutures: 3  Technique: simple  Approximation: close  Lip approximation: vermillion border well aligned  Dressing: 4x4 and splint  Patient tolerance: patient tolerated the procedure well with no immediate complications  My total time at bedside, performing this procedure was 1-15 minutes.

## 2021-03-27 NOTE — ED NOTES
Pt had splint applied. Pt given d/c instructions and verbalized understanding.   Declined w/c and left ambulatory in care of wife in stable condition

## 2021-05-20 ENCOUNTER — HOSPITAL ENCOUNTER (EMERGENCY)
Age: 56
Discharge: HOME | End: 2021-05-20
Attending: EMERGENCY MEDICINE
Payer: COMMERCIAL

## 2021-05-20 VITALS
DIASTOLIC BLOOD PRESSURE: 80 MMHG | RESPIRATION RATE: 16 BRPM | OXYGEN SATURATION: 97 % | SYSTOLIC BLOOD PRESSURE: 149 MMHG | TEMPERATURE: 98.1 F | HEIGHT: 74 IN | WEIGHT: 270.06 LBS | HEART RATE: 70 BPM | BODY MASS INDEX: 34.66 KG/M2

## 2021-05-20 DIAGNOSIS — S41.111A ARM LACERATION, RIGHT, INITIAL ENCOUNTER: Primary | ICD-10-CM

## 2021-05-20 PROCEDURE — 99282 EMERGENCY DEPT VISIT SF MDM: CPT

## 2021-05-20 PROCEDURE — 74011000250 HC RX REV CODE- 250: Performed by: EMERGENCY MEDICINE

## 2021-05-20 PROCEDURE — 75810000293 HC SIMP/SUPERF WND  RPR

## 2021-05-20 RX ORDER — LIDOCAINE HYDROCHLORIDE AND EPINEPHRINE 10; 10 MG/ML; UG/ML
10 INJECTION, SOLUTION INFILTRATION; PERINEURAL
Status: COMPLETED | OUTPATIENT
Start: 2021-05-20 | End: 2021-05-20

## 2021-05-20 RX ADMIN — LIDOCAINE HYDROCHLORIDE AND EPINEPHRINE 100 MG: 10; 10 INJECTION, SOLUTION INFILTRATION; PERINEURAL at 00:53

## 2021-05-20 NOTE — ED PROVIDER NOTES
The history is provided by the patient. Laceration   The incident occurred less than 1 hour ago. The laceration is located on the right arm. The laceration is 4 cm in size. The injury mechanism is broken glass. Foreign body present: no. The pain is mild. The pain has been constant since onset. Pertinent negatives include no numbness and no loss of motion. The patient's last tetanus shot was less than 5 years ago. Past Medical History:   Diagnosis Date    Acid reflux     Anxiety     Baker's cyst     DVT (deep venous thrombosis) (HCC)     Restless leg     Restless leg syndrome        Past Surgical History:   Procedure Laterality Date    HX ORTHOPAEDIC      multiple surgeries to his left leg    IR DILATE ESOPH STRICT      IR IVC FILTER           History reviewed. No pertinent family history. Social History     Socioeconomic History    Marital status:      Spouse name: Not on file    Number of children: Not on file    Years of education: Not on file    Highest education level: Not on file   Occupational History    Not on file   Tobacco Use    Smoking status: Never Smoker    Smokeless tobacco: Never Used   Substance and Sexual Activity    Alcohol use: Yes     Alcohol/week: 12.0 standard drinks     Types: 12 Cans of beer per week    Drug use: Never    Sexual activity: Not on file   Other Topics Concern    Not on file   Social History Narrative    Not on file     Social Determinants of Health     Financial Resource Strain:     Difficulty of Paying Living Expenses:    Food Insecurity:     Worried About Running Out of Food in the Last Year:     [address] in the Last Year:    Transportation Needs:     Lack of Transportation (Medical):      Lack of Transportation (Non-Medical):    Physical Activity:     Days of Exercise per Week:     Minutes of Exercise per Session:    Stress:     Feeling of Stress :    Social Connections:     Frequency of Communication with Friends and Family:     Frequency of Social Gatherings with Friends and Family:     Attends Cheondoism Services:     Active Member of Clubs or Organizations:     Attends Club or Organization Meetings:     Marital Status:    Intimate Partner Violence:     Fear of Current or Ex-Partner:     Emotionally Abused:     Physically Abused:     Sexually Abused: ALLERGIES: Pcn [penicillins]    Review of Systems   Constitutional: Negative for chills and fever. Skin: Positive for wound. Neurological: Negative for numbness. All other systems reviewed and are negative. Vitals:    05/20/21 0041   BP: (!) 149/80   Pulse: 70   Resp: 16   Temp: 98.1 °F (36.7 °C)   SpO2: 97%   Weight: 122.5 kg (270 lb 1 oz)   Height: 6' 2\" (1.88 m)            Physical Exam  Vitals and nursing note reviewed. Constitutional:       Appearance: He is well-developed. HENT:      Head: Normocephalic and atraumatic. Eyes:      General: No scleral icterus. Cardiovascular:      Rate and Rhythm: Normal rate. Heart sounds: Normal heart sounds. Pulmonary:      Effort: Pulmonary effort is normal.   Abdominal:      General: There is no distension. Musculoskeletal:      Cervical back: Normal range of motion. Skin:     General: Skin is warm and dry. Findings: Laceration (crescent shaped laceration to right forearm) present. No erythema or rash. Neurological:      General: No focal deficit present. Mental Status: He is alert and oriented to person, place, and time.    Psychiatric:         Mood and Affect: Mood normal.         Behavior: Behavior normal.          [facility]       Wound Repair    Date/Time: 5/20/2021 1:23 AM  Performed by: attendingPreparation: skin prepped with Cecille-Darius  Location details: right arm  Wound length:2.6 - 7.5 cm  Anesthesia: local infiltration    Anesthesia:  Local Anesthetic: lidocaine 1% with epinephrine  Anesthetic total: 5 mL  Foreign bodies: no foreign bodies  Irrigation solution: saline  Irrigation method: jet lavage  Debridement: none  Wound skin closure material used: 4-0 monocryl. Number of sutures: 6  Technique: simple  Approximation: close  Dressing: gauze roll  Patient tolerance: patient tolerated the procedure well with no immediate complications  My total time at bedside, performing this procedure was 1-15 minutes. Pt presents with an extremity injury. No evidence of fracture, dislocation, or other significant musculoskeletal injury. Patient was discharged home with a plan for pain control as well as instructions on managing his injuries and precautions for returning to the emergency department. Patient will be discharged home to follow-up with primary care provider as instructed in discharge paperwork. Patient and family expressed understanding and agreed with plan. The patient's results have been reviewed with them and/or available family. Patient and/or family verbally conveyed their understanding and agreement of the patient's signs, symptoms, diagnosis, treatment and prognosis and additionally agree to follow up as recommended in the discharge instructions or to return to the Emergency Room should their condition change prior to their follow-up appointment. The patient/family verbally agrees with the care-plan and verbally conveys that all of their questions have been answered. The discharge instructions have also been provided to the patient and/or family with some educational information regarding the patient's diagnosis as well a list of reasons why the patient would want to return to the ER prior to their follow-up appointment, should their condition change.

## 2021-05-20 NOTE — ED NOTES
Six sutures placed by MD, pt tolerated well. Non-adhesive bandage placed. Patient  given copy of dc instructions. Patient verbalized understanding of instructions. Patient alert and oriented and in no acute distress. Patient discharged home ambulatory with spouse.

## 2022-09-23 ENCOUNTER — HOSPITAL ENCOUNTER (INPATIENT)
Age: 57
LOS: 3 days | Discharge: HOME | DRG: 391 | End: 2022-09-27
Attending: EMERGENCY MEDICINE | Admitting: FAMILY MEDICINE
Payer: COMMERCIAL

## 2022-09-23 DIAGNOSIS — G93.41 METABOLIC ENCEPHALOPATHY: ICD-10-CM

## 2022-09-23 DIAGNOSIS — K52.9 COLITIS: Primary | ICD-10-CM

## 2022-09-23 DIAGNOSIS — E86.1 HYPOTENSION DUE TO HYPOVOLEMIA: ICD-10-CM

## 2022-09-23 DIAGNOSIS — A41.9 SEPSIS, DUE TO UNSPECIFIED ORGANISM, UNSPECIFIED WHETHER ACUTE ORGAN DYSFUNCTION PRESENT (HCC): ICD-10-CM

## 2022-09-23 DIAGNOSIS — R19.7 DIARRHEA IN ADULT PATIENT: ICD-10-CM

## 2022-09-23 DIAGNOSIS — I95.89 HYPOTENSION DUE TO HYPOVOLEMIA: ICD-10-CM

## 2022-09-23 DIAGNOSIS — Z79.899 POLYPHARMACY: ICD-10-CM

## 2022-09-23 DIAGNOSIS — J18.9 COMMUNITY ACQUIRED PNEUMONIA, UNSPECIFIED LATERALITY: ICD-10-CM

## 2022-09-23 PROCEDURE — 99285 EMERGENCY DEPT VISIT HI MDM: CPT

## 2022-09-24 ENCOUNTER — APPOINTMENT (OUTPATIENT)
Dept: GENERAL RADIOLOGY | Age: 57
DRG: 391 | End: 2022-09-24
Attending: EMERGENCY MEDICINE
Payer: COMMERCIAL

## 2022-09-24 ENCOUNTER — APPOINTMENT (OUTPATIENT)
Dept: CT IMAGING | Age: 57
DRG: 391 | End: 2022-09-24
Attending: EMERGENCY MEDICINE
Payer: COMMERCIAL

## 2022-09-24 PROBLEM — K52.9 COLITIS: Status: ACTIVE | Noted: 2022-09-24

## 2022-09-24 PROBLEM — J18.9 PNEUMONIA: Status: ACTIVE | Noted: 2022-09-24

## 2022-09-24 LAB
ALBUMIN SERPL-MCNC: 3.4 G/DL (ref 3.5–5)
ALBUMIN/GLOB SERPL: 0.9 {RATIO} (ref 1.1–2.2)
ALP SERPL-CCNC: 60 U/L (ref 45–117)
ALT SERPL-CCNC: 7 U/L (ref 12–78)
AMPHET UR QL SCN: NEGATIVE
ANION GAP SERPL CALC-SCNC: 8 MMOL/L (ref 5–15)
APPEARANCE UR: CLEAR
AST SERPL-CCNC: 15 U/L (ref 15–37)
ATRIAL RATE: 46 BPM
BACTERIA URNS QL MICRO: NEGATIVE /HPF
BARBITURATES UR QL SCN: NEGATIVE
BASOPHILS # BLD: 0 K/UL (ref 0–0.1)
BASOPHILS NFR BLD: 0 % (ref 0–1)
BENZODIAZ UR QL: POSITIVE
BILIRUB SERPL-MCNC: 0.4 MG/DL (ref 0.2–1)
BILIRUB UR QL: NEGATIVE
BUN SERPL-MCNC: 14 MG/DL (ref 6–20)
BUN/CREAT SERPL: 12 (ref 12–20)
CALCIUM SERPL-MCNC: 8.7 MG/DL (ref 8.5–10.1)
CALCULATED P AXIS, ECG09: 18 DEGREES
CALCULATED R AXIS, ECG10: 29 DEGREES
CALCULATED T AXIS, ECG11: 10 DEGREES
CANNABINOIDS UR QL SCN: NEGATIVE
CHLORIDE SERPL-SCNC: 104 MMOL/L (ref 97–108)
CO2 SERPL-SCNC: 26 MMOL/L (ref 21–32)
COCAINE UR QL SCN: NEGATIVE
COLOR UR: ABNORMAL
COMMENT, HOLDF: NORMAL
COVID-19 RAPID TEST, COVR: NOT DETECTED
CREAT SERPL-MCNC: 1.16 MG/DL (ref 0.7–1.3)
DIAGNOSIS, 93000: NORMAL
DIFFERENTIAL METHOD BLD: ABNORMAL
DRUG SCRN COMMENT,DRGCM: ABNORMAL
EOSINOPHIL # BLD: 0.5 K/UL (ref 0–0.4)
EOSINOPHIL NFR BLD: 5 % (ref 0–7)
EPITH CASTS URNS QL MICRO: ABNORMAL /LPF
ERYTHROCYTE [DISTWIDTH] IN BLOOD BY AUTOMATED COUNT: 14.6 % (ref 11.5–14.5)
ETHANOL SERPL-MCNC: <10 MG/DL
FLUAV AG NPH QL IA: NEGATIVE
FLUBV AG NOSE QL IA: NEGATIVE
GLOBULIN SER CALC-MCNC: 3.8 G/DL (ref 2–4)
GLUCOSE SERPL-MCNC: 123 MG/DL (ref 65–100)
GLUCOSE UR STRIP.AUTO-MCNC: NEGATIVE MG/DL
HCT VFR BLD AUTO: 34.2 % (ref 36.6–50.3)
HGB BLD-MCNC: 10.8 G/DL (ref 12.1–17)
HGB UR QL STRIP: ABNORMAL
IMM GRANULOCYTES # BLD AUTO: 0 K/UL (ref 0–0.04)
IMM GRANULOCYTES NFR BLD AUTO: 0 % (ref 0–0.5)
INR PPP: 1.6 (ref 0.9–1.1)
KETONES UR QL STRIP.AUTO: NEGATIVE MG/DL
LACTATE SERPL-SCNC: 0.6 MMOL/L (ref 0.4–2)
LEUKOCYTE ESTERASE UR QL STRIP.AUTO: NEGATIVE
LIPASE SERPL-CCNC: 52 U/L (ref 73–393)
LYMPHOCYTES # BLD: 2.9 K/UL (ref 0.8–3.5)
LYMPHOCYTES NFR BLD: 27 % (ref 12–49)
MCH RBC QN AUTO: 27.1 PG (ref 26–34)
MCHC RBC AUTO-ENTMCNC: 31.6 G/DL (ref 30–36.5)
MCV RBC AUTO: 85.9 FL (ref 80–99)
METHADONE UR QL: NEGATIVE
MONOCYTES # BLD: 0.8 K/UL (ref 0–1)
MONOCYTES NFR BLD: 8 % (ref 5–13)
NEUTS SEG # BLD: 6.5 K/UL (ref 1.8–8)
NEUTS SEG NFR BLD: 60 % (ref 32–75)
NITRITE UR QL STRIP.AUTO: NEGATIVE
NRBC # BLD: 0 K/UL (ref 0–0.01)
NRBC BLD-RTO: 0 PER 100 WBC
OPIATES UR QL: POSITIVE
P-R INTERVAL, ECG05: 222 MS
PCP UR QL: NEGATIVE
PH UR STRIP: 6 [PH] (ref 5–8)
PLATELET # BLD AUTO: 242 K/UL (ref 150–400)
PMV BLD AUTO: 9.7 FL (ref 8.9–12.9)
POTASSIUM SERPL-SCNC: 3.3 MMOL/L (ref 3.5–5.1)
PROT SERPL-MCNC: 7.2 G/DL (ref 6.4–8.2)
PROT UR STRIP-MCNC: NEGATIVE MG/DL
PROTHROMBIN TIME: 15.2 SEC (ref 9–11.1)
Q-T INTERVAL, ECG07: 472 MS
QRS DURATION, ECG06: 88 MS
QTC CALCULATION (BEZET), ECG08: 413 MS
RBC # BLD AUTO: 3.98 M/UL (ref 4.1–5.7)
RBC #/AREA URNS HPF: ABNORMAL /HPF (ref 0–5)
SAMPLES BEING HELD,HOLD: NORMAL
SODIUM SERPL-SCNC: 138 MMOL/L (ref 136–145)
SOURCE, COVRS: NORMAL
SP GR UR REFRACTOMETRY: 1.01 (ref 1–1.03)
TROPONIN-HIGH SENSITIVITY: 10 NG/L (ref 0–76)
UR CULT HOLD, URHOLD: NORMAL
UROBILINOGEN UR QL STRIP.AUTO: 0.2 EU/DL (ref 0.2–1)
VENTRICULAR RATE, ECG03: 46 BPM
WBC # BLD AUTO: 10.8 K/UL (ref 4.1–11.1)
WBC URNS QL MICRO: ABNORMAL /HPF (ref 0–4)

## 2022-09-24 PROCEDURE — 97116 GAIT TRAINING THERAPY: CPT

## 2022-09-24 PROCEDURE — 74011250636 HC RX REV CODE- 250/636: Performed by: PHYSICIAN ASSISTANT

## 2022-09-24 PROCEDURE — 85025 COMPLETE CBC W/AUTO DIFF WBC: CPT

## 2022-09-24 PROCEDURE — 80053 COMPREHEN METABOLIC PANEL: CPT

## 2022-09-24 PROCEDURE — 87040 BLOOD CULTURE FOR BACTERIA: CPT

## 2022-09-24 PROCEDURE — 71260 CT THORAX DX C+: CPT

## 2022-09-24 PROCEDURE — 87070 CULTURE OTHR SPECIMN AEROBIC: CPT

## 2022-09-24 PROCEDURE — 71045 X-RAY EXAM CHEST 1 VIEW: CPT

## 2022-09-24 PROCEDURE — 87506 IADNA-DNA/RNA PROBE TQ 6-11: CPT

## 2022-09-24 PROCEDURE — 96375 TX/PRO/DX INJ NEW DRUG ADDON: CPT

## 2022-09-24 PROCEDURE — 74011250637 HC RX REV CODE- 250/637: Performed by: FAMILY MEDICINE

## 2022-09-24 PROCEDURE — 84484 ASSAY OF TROPONIN QUANT: CPT

## 2022-09-24 PROCEDURE — 96361 HYDRATE IV INFUSION ADD-ON: CPT

## 2022-09-24 PROCEDURE — 74177 CT ABD & PELVIS W/CONTRAST: CPT

## 2022-09-24 PROCEDURE — 74011250636 HC RX REV CODE- 250/636: Performed by: FAMILY MEDICINE

## 2022-09-24 PROCEDURE — 87804 INFLUENZA ASSAY W/OPTIC: CPT

## 2022-09-24 PROCEDURE — 85610 PROTHROMBIN TIME: CPT

## 2022-09-24 PROCEDURE — 93005 ELECTROCARDIOGRAM TRACING: CPT

## 2022-09-24 PROCEDURE — 74011000250 HC RX REV CODE- 250: Performed by: INTERNAL MEDICINE

## 2022-09-24 PROCEDURE — 81001 URINALYSIS AUTO W/SCOPE: CPT

## 2022-09-24 PROCEDURE — 82077 ASSAY SPEC XCP UR&BREATH IA: CPT

## 2022-09-24 PROCEDURE — 74011250636 HC RX REV CODE- 250/636: Performed by: EMERGENCY MEDICINE

## 2022-09-24 PROCEDURE — 96365 THER/PROPH/DIAG IV INF INIT: CPT

## 2022-09-24 PROCEDURE — 70450 CT HEAD/BRAIN W/O DYE: CPT

## 2022-09-24 PROCEDURE — 83690 ASSAY OF LIPASE: CPT

## 2022-09-24 PROCEDURE — 97161 PT EVAL LOW COMPLEX 20 MIN: CPT

## 2022-09-24 PROCEDURE — 74011250636 HC RX REV CODE- 250/636: Performed by: INTERNAL MEDICINE

## 2022-09-24 PROCEDURE — 74011250637 HC RX REV CODE- 250/637: Performed by: INTERNAL MEDICINE

## 2022-09-24 PROCEDURE — 80307 DRUG TEST PRSMV CHEM ANLYZR: CPT

## 2022-09-24 PROCEDURE — 87635 SARS-COV-2 COVID-19 AMP PRB: CPT

## 2022-09-24 PROCEDURE — 74011000636 HC RX REV CODE- 636

## 2022-09-24 PROCEDURE — 36415 COLL VENOUS BLD VENIPUNCTURE: CPT

## 2022-09-24 PROCEDURE — 65270000046 HC RM TELEMETRY

## 2022-09-24 PROCEDURE — 83605 ASSAY OF LACTIC ACID: CPT

## 2022-09-24 RX ORDER — HYDROMORPHONE HYDROCHLORIDE 2 MG/1
8 TABLET ORAL
Status: DISCONTINUED | OUTPATIENT
Start: 2022-09-24 | End: 2022-09-24

## 2022-09-24 RX ORDER — HYDROMORPHONE HYDROCHLORIDE 2 MG/1
4 TABLET ORAL
Status: DISCONTINUED | OUTPATIENT
Start: 2022-09-24 | End: 2022-09-27 | Stop reason: HOSPADM

## 2022-09-24 RX ORDER — LEVOFLOXACIN 5 MG/ML
750 INJECTION, SOLUTION INTRAVENOUS ONCE
Status: COMPLETED | OUTPATIENT
Start: 2022-09-24 | End: 2022-09-24

## 2022-09-24 RX ORDER — PANTOPRAZOLE SODIUM 40 MG/1
40 TABLET, DELAYED RELEASE ORAL
Status: DISCONTINUED | OUTPATIENT
Start: 2022-09-25 | End: 2022-09-27 | Stop reason: HOSPADM

## 2022-09-24 RX ORDER — ALPRAZOLAM 0.5 MG/1
0.5 TABLET ORAL
Status: DISCONTINUED | OUTPATIENT
Start: 2022-09-24 | End: 2022-09-27 | Stop reason: HOSPADM

## 2022-09-24 RX ORDER — ACETAMINOPHEN 650 MG/1
650 SUPPOSITORY RECTAL
Status: DISCONTINUED | OUTPATIENT
Start: 2022-09-24 | End: 2022-09-27 | Stop reason: HOSPADM

## 2022-09-24 RX ORDER — HYDRALAZINE HYDROCHLORIDE 20 MG/ML
10 INJECTION INTRAMUSCULAR; INTRAVENOUS
Status: DISCONTINUED | OUTPATIENT
Start: 2022-09-24 | End: 2022-09-27 | Stop reason: HOSPADM

## 2022-09-24 RX ORDER — HYDROMORPHONE HYDROCHLORIDE 4 MG/1
24 TABLET ORAL 2 TIMES DAILY
Status: DISCONTINUED | OUTPATIENT
Start: 2022-09-24 | End: 2022-09-24

## 2022-09-24 RX ORDER — TIZANIDINE HYDROCHLORIDE 2 MG/1
6 CAPSULE, GELATIN COATED ORAL DAILY
Status: DISCONTINUED | OUTPATIENT
Start: 2022-09-25 | End: 2022-09-24

## 2022-09-24 RX ORDER — DOXYCYCLINE HYCLATE 100 MG
100 TABLET ORAL
Status: DISCONTINUED | OUTPATIENT
Start: 2022-09-24 | End: 2022-09-24

## 2022-09-24 RX ORDER — SODIUM CHLORIDE 9 MG/ML
75 INJECTION, SOLUTION INTRAVENOUS CONTINUOUS
Status: DISCONTINUED | OUTPATIENT
Start: 2022-09-24 | End: 2022-09-27

## 2022-09-24 RX ORDER — LEVOFLOXACIN 5 MG/ML
750 INJECTION, SOLUTION INTRAVENOUS EVERY 24 HOURS
Status: DISCONTINUED | OUTPATIENT
Start: 2022-09-25 | End: 2022-09-27 | Stop reason: HOSPADM

## 2022-09-24 RX ORDER — CARBIDOPA AND LEVODOPA 25; 100 MG/1; MG/1
1 TABLET ORAL 3 TIMES DAILY
Status: DISCONTINUED | OUTPATIENT
Start: 2022-09-24 | End: 2022-09-26

## 2022-09-24 RX ORDER — HYDROMORPHONE HYDROCHLORIDE 2 MG/1
8 TABLET ORAL 2 TIMES DAILY
Status: DISCONTINUED | OUTPATIENT
Start: 2022-09-24 | End: 2022-09-24

## 2022-09-24 RX ORDER — ONDANSETRON 4 MG/1
4 TABLET, ORALLY DISINTEGRATING ORAL
Status: DISCONTINUED | OUTPATIENT
Start: 2022-09-24 | End: 2022-09-27 | Stop reason: HOSPADM

## 2022-09-24 RX ORDER — SODIUM CHLORIDE 0.9 % (FLUSH) 0.9 %
5-40 SYRINGE (ML) INJECTION AS NEEDED
Status: DISCONTINUED | OUTPATIENT
Start: 2022-09-24 | End: 2022-09-27 | Stop reason: HOSPADM

## 2022-09-24 RX ORDER — SODIUM CHLORIDE 0.9 % (FLUSH) 0.9 %
5-40 SYRINGE (ML) INJECTION EVERY 8 HOURS
Status: DISCONTINUED | OUTPATIENT
Start: 2022-09-24 | End: 2022-09-27 | Stop reason: HOSPADM

## 2022-09-24 RX ORDER — VALSARTAN 80 MG/1
80 TABLET ORAL DAILY
COMMUNITY

## 2022-09-24 RX ORDER — VALSARTAN 80 MG/1
80 TABLET ORAL DAILY
Status: DISCONTINUED | OUTPATIENT
Start: 2022-09-24 | End: 2022-09-25 | Stop reason: SDUPTHER

## 2022-09-24 RX ORDER — CARBIDOPA AND LEVODOPA 10; 100 MG/1; MG/1
2 TABLET ORAL 2 TIMES DAILY
Status: DISCONTINUED | OUTPATIENT
Start: 2022-09-24 | End: 2022-09-24

## 2022-09-24 RX ORDER — ONDANSETRON 2 MG/ML
4 INJECTION INTRAMUSCULAR; INTRAVENOUS
Status: DISCONTINUED | OUTPATIENT
Start: 2022-09-24 | End: 2022-09-27 | Stop reason: HOSPADM

## 2022-09-24 RX ORDER — TIZANIDINE 4 MG/1
4 TABLET ORAL 4 TIMES DAILY
Status: DISCONTINUED | OUTPATIENT
Start: 2022-09-24 | End: 2022-09-27 | Stop reason: HOSPADM

## 2022-09-24 RX ORDER — SODIUM CHLORIDE 0.9 % (FLUSH) 0.9 %
5-10 SYRINGE (ML) INJECTION AS NEEDED
Status: DISCONTINUED | OUTPATIENT
Start: 2022-09-24 | End: 2022-09-27 | Stop reason: HOSPADM

## 2022-09-24 RX ORDER — HYDROMORPHONE HYDROCHLORIDE 4 MG/1
24 TABLET ORAL EVERY 12 HOURS
Status: DISCONTINUED | OUTPATIENT
Start: 2022-09-24 | End: 2022-09-27 | Stop reason: HOSPADM

## 2022-09-24 RX ORDER — POLYETHYLENE GLYCOL 3350 17 G/17G
17 POWDER, FOR SOLUTION ORAL DAILY PRN
Status: DISCONTINUED | OUTPATIENT
Start: 2022-09-24 | End: 2022-09-27 | Stop reason: HOSPADM

## 2022-09-24 RX ORDER — VANCOMYCIN 2 GRAM/500 ML IN 0.9 % SODIUM CHLORIDE INTRAVENOUS
2 ONCE
Status: DISCONTINUED | OUTPATIENT
Start: 2022-09-24 | End: 2022-09-24

## 2022-09-24 RX ORDER — ACETAMINOPHEN 325 MG/1
650 TABLET ORAL
Status: DISCONTINUED | OUTPATIENT
Start: 2022-09-24 | End: 2022-09-27 | Stop reason: HOSPADM

## 2022-09-24 RX ORDER — ONDANSETRON 2 MG/ML
4 INJECTION INTRAMUSCULAR; INTRAVENOUS
Status: COMPLETED | OUTPATIENT
Start: 2022-09-24 | End: 2022-09-24

## 2022-09-24 RX ORDER — WARFARIN SODIUM 5 MG/1
5 TABLET ORAL ONCE
Status: COMPLETED | OUTPATIENT
Start: 2022-09-24 | End: 2022-09-24

## 2022-09-24 RX ORDER — METRONIDAZOLE 500 MG/100ML
500 INJECTION, SOLUTION INTRAVENOUS EVERY 12 HOURS
Status: DISCONTINUED | OUTPATIENT
Start: 2022-09-24 | End: 2022-09-27 | Stop reason: HOSPADM

## 2022-09-24 RX ADMIN — METRONIDAZOLE 500 MG: 500 INJECTION, SOLUTION INTRAVENOUS at 13:35

## 2022-09-24 RX ADMIN — SODIUM CHLORIDE 1000 ML: 9 INJECTION, SOLUTION INTRAVENOUS at 01:04

## 2022-09-24 RX ADMIN — CARBIDOPA AND LEVODOPA 1 TABLET: 25; 100 TABLET ORAL at 17:26

## 2022-09-24 RX ADMIN — CARBIDOPA AND LEVODOPA 1 TABLET: 25; 100 TABLET ORAL at 21:02

## 2022-09-24 RX ADMIN — VANCOMYCIN HYDROCHLORIDE 750 MG: 750 INJECTION, POWDER, LYOPHILIZED, FOR SOLUTION INTRAVENOUS at 15:04

## 2022-09-24 RX ADMIN — TIZANIDINE 4 MG: 4 TABLET ORAL at 17:26

## 2022-09-24 RX ADMIN — SODIUM CHLORIDE 1000 ML: 9 INJECTION, SOLUTION INTRAVENOUS at 00:16

## 2022-09-24 RX ADMIN — IOPAMIDOL 100 ML: 755 INJECTION, SOLUTION INTRAVENOUS at 01:38

## 2022-09-24 RX ADMIN — SODIUM CHLORIDE, PRESERVATIVE FREE 5 ML: 5 INJECTION INTRAVENOUS at 13:38

## 2022-09-24 RX ADMIN — HYDRALAZINE HYDROCHLORIDE 10 MG: 20 INJECTION INTRAMUSCULAR; INTRAVENOUS at 17:26

## 2022-09-24 RX ADMIN — HYDROMORPHONE HYDROCHLORIDE 24 MG: 4 TABLET ORAL at 20:58

## 2022-09-24 RX ADMIN — VANCOMYCIN HYDROCHLORIDE 1250 MG: 1.25 INJECTION, POWDER, LYOPHILIZED, FOR SOLUTION INTRAVENOUS at 04:01

## 2022-09-24 RX ADMIN — VANCOMYCIN HYDROCHLORIDE 750 MG: 750 INJECTION, POWDER, LYOPHILIZED, FOR SOLUTION INTRAVENOUS at 23:28

## 2022-09-24 RX ADMIN — SODIUM CHLORIDE, PRESERVATIVE FREE 10 ML: 5 INJECTION INTRAVENOUS at 21:03

## 2022-09-24 RX ADMIN — WARFARIN SODIUM 5 MG: 5 TABLET ORAL at 17:26

## 2022-09-24 RX ADMIN — ONDANSETRON 4 MG: 2 INJECTION INTRAMUSCULAR; INTRAVENOUS at 00:16

## 2022-09-24 RX ADMIN — TIZANIDINE 4 MG: 4 TABLET ORAL at 21:02

## 2022-09-24 RX ADMIN — VANCOMYCIN HYDROCHLORIDE 1250 MG: 1.25 INJECTION, POWDER, LYOPHILIZED, FOR SOLUTION INTRAVENOUS at 04:06

## 2022-09-24 RX ADMIN — SODIUM CHLORIDE 75 ML/HR: 9 INJECTION, SOLUTION INTRAVENOUS at 19:07

## 2022-09-24 RX ADMIN — LEVOFLOXACIN 750 MG: 5 INJECTION, SOLUTION INTRAVENOUS at 03:39

## 2022-09-24 NOTE — PROGRESS NOTES
TRANSITION OF CARE  RUR--6%  Disposition--Home with family assist.  Transport--Family    Patient's primary family contact: spouse Staradhames    Care Management Interventions  PCP Verified by CM: Yes  Transition of Care Consult (CM Consult): Other (None)  Physical Therapy Consult: No  Occupational Therapy Consult: No  Speech Therapy Consult: No  Support Systems: Spouse/Significant Other  Confirm Follow Up Transport: Family  The Plan for Transition of Care is Related to the Following Treatment Goals : Home with family assistance. Discharge Location  Patient Expects to be Discharged to[de-identified] Home with family assistance    Reason for Admission: colitis with diarrhea       PNA       Sepsis                    RUR Score:           6%             Plan for utilizing home health:      None    PCP: First and Last name:  Ishmael Ahmadi MD   Name of Practice:    Are you a current patient: Yes    Approximate date of last visit: 8//28/22   Can you participate in a virtual visit with your PCP:                     Current Advanced Directive/Advance Care Plan: Full Code    Healthcare Decision Maker:   Click here to complete [address] including selection of the Healthcare Decision Maker Relationship (ie \"Primary\")                         Transition of Care Plan:    CM met with patient and his wife with whom he lives. They live in a one story home. They have 2 supportive adult daughters who live locally. Patient reports good family /social support. Patient is ambulatory and expects return to independent functioning. Patient confirmed PCP, health insurance, and prescription coverage.    Previous home health : None  Previous IPR/ SNF rehab : None  DME : None

## 2022-09-24 NOTE — ED TRIAGE NOTES
Patient arrives to the ER ambulatory complaining of diarrhea x3 days, lack of appetite, and coughing up black/brown mucus. Patient also complains of abdominal pain and vomiting.

## 2022-09-24 NOTE — PROGRESS NOTES
Clinical Pharmacy Note: Metronidazole Dosing    Please note that the metronidazole dose for Wilfrid Maxwell has been changed to 500 mg  q12h per [facility]-approved protocol. Please contact the pharmacy with any questions.     Stacy Villaseñor [facility]

## 2022-09-24 NOTE — PROGRESS NOTES
Bedside and Verbal shift change report given to Juan Pinto RN (oncoming nurse) by Nioxn Smith RN (offgoing nurse). Report included the following information SBAR, Kardex, Intake/Output, Med Rec Status, and Cardiac Rhythm NSR .     Champ Roman RN

## 2022-09-24 NOTE — ED PROVIDER NOTES
60-year-old male with a history of chronic pain, on high-dose Dilaudid twice daily, as well as Zanaflex, Xanax, Ambien, and carbidopa levodopa, presents to the emergency department noting a 3-day history of diarrhea, cough, nasal congestion and productive cough with brown-colored mucus. He denies any significant abdominal pain but states that comes and goes and cramps. He denies any blood in his stool. He is currently taking Coumadin for reported prior DVT. He denies any known sick contacts or recent travel. His wife states that he seems to be slurring his words and more confused than he normally is at baseline. He did take his Rx'd Dilaudid shortly prior to arrival.  He denies any fever, chills, dysuria, hematuria, or any other medical concerns. Diarrhea   Associated symptoms include diarrhea. Pertinent negatives include no fever, no nausea, no vomiting, no constipation, no dysuria, no hematuria, no headaches, no arthralgias, no myalgias, no chest pain, no testicular pain and no back pain. Abdominal Pain   Associated symptoms include diarrhea. Pertinent negatives include no fever, no nausea, no vomiting, no constipation, no dysuria, no hematuria, no headaches, no arthralgias, no myalgias, no chest pain, no testicular pain and no back pain. Past Medical History:   Diagnosis Date    Acid reflux     Anxiety     Baker's cyst     DVT (deep venous thrombosis) (HCC)     Restless leg     Restless leg syndrome        Past Surgical History:   Procedure Laterality Date    HX ORTHOPAEDIC      multiple surgeries to his left leg    IR DILATE ESOPH STRICT      IR IVC FILTER           History reviewed. No pertinent family history.     Social History     Socioeconomic History    Marital status:      Spouse name: Not on file    Number of children: Not on file    Years of education: Not on file    Highest education level: Not on file   Occupational History    Not on file   Tobacco Use    Smoking status: Never Smokeless tobacco: Never   Substance and Sexual Activity    Alcohol use: Yes     Alcohol/week: 12.0 standard drinks     Types: 12 Cans of beer per week    Drug use: Never    Sexual activity: Not on file   Other Topics Concern    Not on file   Social History Narrative    Not on file     Social Determinants of Health     Financial Resource Strain: Not on file   Food Insecurity: Not on file   Transportation Needs: Not on file   Physical Activity: Not on file   Stress: Not on file   Social Connections: Not on file   Intimate Partner Violence: Not on file   Housing Stability: Not on file         ALLERGIES: Pcn [penicillins]    Review of Systems   Constitutional:  Positive for activity change. Negative for appetite change, chills and fever. HENT:  Positive for congestion, rhinorrhea and sinus pressure. Negative for sinus pain, sneezing and sore throat. Eyes:  Negative for photophobia and visual disturbance. Respiratory:  Positive for cough. Negative for shortness of breath. Cardiovascular:  Negative for chest pain. Gastrointestinal:  Positive for abdominal pain and diarrhea. Negative for blood in stool, constipation, nausea and vomiting. Genitourinary:  Negative for difficulty urinating, dysuria, flank pain, hematuria, penile pain and testicular pain. Musculoskeletal:  Negative for arthralgias, back pain, myalgias and neck pain. Skin:  Negative for rash and wound. Neurological:  Positive for speech difficulty. Negative for syncope, weakness, light-headedness, numbness and headaches. Psychiatric/Behavioral:  Positive for confusion. Negative for self-injury and suicidal ideas. All other systems reviewed and are negative. Vitals:    09/24/22 0200 09/24/22 0215 09/24/22 0245 09/24/22 0330   BP: 97/63 93/68 (!) 93/53 101/61   Pulse:       Resp:       Temp:       SpO2: 97% 99% 98% 97%   Weight:       Height:                Physical Exam  Vitals and nursing note reviewed.    Constitutional: General: He is not in acute distress. Appearance: Normal appearance. He is well-developed. He is obese. He is not diaphoretic. HENT:      Head: Normocephalic and atraumatic. Nose: Congestion and rhinorrhea present. Eyes:      Extraocular Movements: Extraocular movements intact. Conjunctiva/sclera: Conjunctivae normal.      Pupils: Pupils are equal, round, and reactive to light. Cardiovascular:      Rate and Rhythm: Normal rate and regular rhythm. Heart sounds: Normal heart sounds. Pulmonary:      Effort: Pulmonary effort is normal.      Breath sounds: Normal breath sounds. Abdominal:      General: There is no distension. Palpations: Abdomen is soft. Tenderness: There is abdominal tenderness (mild) in the right lower quadrant, suprapubic area and left lower quadrant. There is no right CVA tenderness, left CVA tenderness, guarding or rebound. Musculoskeletal:         General: No tenderness. Cervical back: Neck supple. Skin:     General: Skin is warm and dry. Neurological:      General: No focal deficit present. Mental Status: He is alert and oriented to person, place, and time. Cranial Nerves: No cranial nerve deficit. Sensory: No sensory deficit. Motor: No weakness. Coordination: Coordination normal.      Comments: Dysarthric speech but intact sensation throughout, 5/5 strength in all 4 extremities, intact finger to nose, neg pronator drift, CN intact. MDM    49-year-old male presents with URI symptoms, cough, and diarrhea and confusion. He is afebrile satting 97% on room air. Initial BP soft with systolics in the 93T. Rapid COVID swab negative, influenza swab negative. Labs returned reassuringly showing no significant abnormalities, normal lactic acid, no leukocytosis, Hg 10.8, INR 1.6, on Coumadin, negative EtOH. Chest x-ray was viewed by myself and read by radiology showing concern for possible pneumonia.   Initial plan was to start on p.o. doxycycline and discharge but when he was reevaluated he was seen to have a drop in his blood pressure down to the 42W systolic. He was given IV fluid bolus and his blood pressure normalized up to 101/61 with maps in the 70s. CT head shows no acute intracranial normalities. CT chest abdomen pelvis shows patchy bilateral airspace disease compatible with pneumonia as well as evidence of colitis. He was given IV ABX avoiding penicillins or penicillin relatives given anaphylactic history. Will admit to the hospitalist service for further care and assessment. Please note that this dictation was completed with [employer], the [employer] voice recognition software. Quite often unanticipated grammatical, syntax, homophones, and other interpretive errors are inadvertently transcribed by the computer software. Please disregard these errors. Please excuse any errors that have escaped final proofreading. Total critical care time (not including time spent performing separately reportable procedures): 60 minutes    Procedures    115 EKG shows sinus bradycardia with a rate of 46 bpm with first-degree AV block but no acute ST or T wave abnormalities suggestive of ischemia. Perfect Serve Consult for Admission  3:33 AM    ED Room Number: DYO99/14  Patient Name and age:  Adrián Maxwell 62 y.o.  male  Working Diagnosis:   1. Colitis    2. Community acquired pneumonia, unspecified laterality    3. Diarrhea in adult patient    4. Hypotension due to hypovolemia    5. Polypharmacy    6. Metabolic encephalopathy    7.  Sepsis, due to unspecified organism, unspecified whether acute organ dysfunction present ([facility] [address]        COVID-19 Suspicion:  no  Sepsis present:  yes  Reassessment needed: no  Code Status:  Full Code  Readmission: no  Isolation Requirements:  no  Recommended Level of Care:  step down  Department:[place] [facility] - [phone]  Other: 51-year-old female presents with 3-day history of cough intermittently productive and diarrhea with increasing reported confusion and slurred speech by his wife. History of chronic pain on large doses of p.o. Dilaudid at baseline. Work-up shows pneumonia as well as colitis. Ordered IV ABX. While being worked up in the ED he had drop in his blood pressure to the 24D systolic but has returned to normal after IV fluid resuscitation. Normal lactate.

## 2022-09-24 NOTE — H&P
[address] [facility] Adult  Hospitalist Group  History and Physical    Date of Service:  9/24/2022  Primary Care Provider: Conner Navarro MD  Source of information: The patient    Chief Complaint: Diarrhea and Abdominal Pain      History of Presenting Illness:   Adwoa Sainz is a 62 y.o. male with known history of chronic pain on Dilaudid p.o. who presents to ED for evaluation of shortness of breath, chills, productive cough with greenish-yellowish sputum for 2 3 days,  also abdominal pain left lower quadrant associated with this diarrhea up to 20 times yesterday, patient denied having any bloody stool. Per patient he did not take any antibiotic recently. In the emergency department patient was evaluated CT chest was significant for bilateral pneumonia and CT abdomen was significant for sigmoid colitis. Antibiotic were initiated, medicine was consulted for admission and further evaluation. According to patient he was not able to eat for a few days due to intractable nausea vomiting and diarrhea. The patient has history of DVT in left lower extremity after fracture and surgery, he is on Coumadin, INR is subtherapeutic 1.6. Blood culture, stool culture pending    The patient denies any headache, blurry vision, sore throat, trouble swallowing, trouble with speech, chest pain, urinary symptoms, constipation, recent travels, sick contacts, focal or generalized neurological symptoms, falls, injuries, rashes, contact with COVID-19 diagnosed patients, hematemesis, melena, hemoptysis, hematuria, rashes, denies starting any new medications and denies any other concerns or problems besides as mentioned above. REVIEW OF SYSTEMS:  A comprehensive review of systems was negative except for that written in the History of Present Illness.      Past Medical History:   Diagnosis Date    Acid reflux     Anxiety     Baker's cyst     DVT (deep venous thrombosis) (HCC)     Hypertension     Restless leg Restless leg syndrome       Past Surgical History:   Procedure Laterality Date    HX ORTHOPAEDIC      multiple surgeries to his left leg    IR DILATE ESOPH STRICT      IR IVC FILTER       Prior to Admission medications    Medication Sig Start Date End Date Taking? Authorizing Provider   valsartan (DIOVAN) 80 mg tablet Take 80 mg by mouth daily. Yes Provider, Historical   HYDROmorphone (DILAUDID) 8 mg tablet Take 4 mg by mouth two (2) times a day. Yes Other, MD Julieth   zolpidem (AMBIEN) 10 mg tablet Take 10 mg by mouth nightly as needed for Sleep. Yes Other, MD Julieth   esomeprazole (NEXIUM) 40 mg capsule Take  by mouth daily. Yes Other, MD Julieth   carbidopa-levodopa (SINEMET)  mg per tablet Take 2 Tabs by mouth two (2) times a day. 2 tab at night and 1 tab in the morning   Yes Other, MD Julieth   tiZANidine (ZANAFLEX) 2 mg capsule Take 6 mg by mouth daily. Takes 3 at night   Yes Other, MD Julieth   ALPRAZolam [facility]) 0.5 mg tablet Take  by mouth. Yes Other, MD Julieth   warfarin (COUMADIN) 5 mg tablet Take 5 mg by mouth daily. BID on MWF   Yes Other, MD Julieth     Allergies   Allergen Reactions    Pcn [Penicillins] Anaphylaxis      History reviewed. No pertinent family history. Father Lung cancer, Mother liver cancer. Social History:  reports that he has never smoked. He has never used smokeless tobacco. He reports current alcohol use of about 12.0 standard drinks per week. He reports that he does not use drugs. Family and social history were personally reviewed, all pertinent and relevant details are outlined as above.     Objective:   Visit Vitals  BP (!) 150/86 (BP 1 Location: Left upper arm, BP Patient Position: Lying)   Pulse 95   Temp 97 °F (36.1 °C)   Resp 19   Ht 6' 2\" (1.88 m)   Wt 121.4 kg (267 lb 10.2 oz)   SpO2 98%   BMI 34.36 kg/m²      O2 Device: None (Room air)    PHYSICAL EXAM:   General: Alert x oriented x 3, awake, + weak, resting in bed, pleasant male, appears to be stated age  [de-identified]: PEERL, EOMI, moist mucus membranes  Neck: Supple, no JVD, no meningeal signs  Chest: Coarse to auscultation bilaterally   CVS: RRR, S1 S2 heard, no murmurs/rubs/gallops  Abd: Soft, LLQ tender, NRT,non-distended, +bowel sounds   Ext: No clubbing, no cyanosis, no edema  Neuro/Psych: Pleasant mood and affect, CN 2-12 grossly intact, sensory grossly within normal limit, Strength 5/5 in all extremities, DTR 1+ x 4  Cap refill: Brisk, less than 3 seconds  Pulses: 2+, symmetric in all extremities  Skin: Warm, dry, without rashes or lesions  Scar LLE    Data Review: All diagnostic labs and studies have been reviewed. Abnormal Labs Reviewed   METABOLIC PANEL, COMPREHENSIVE - Abnormal; Notable for the following components:       Result Value    Potassium 3.3 (*)     Glucose 123 (*)     ALT (SGPT) 7 (*)     Albumin 3.4 (*)     A-G Ratio 0.9 (*)     All other components within normal limits   LIPASE - Abnormal; Notable for the following components:    Lipase 52 (*)     All other components within normal limits   CBC WITH AUTOMATED DIFF - Abnormal; Notable for the following components:    RBC 3.98 (*)     HGB 10.8 (*)     HCT 34.2 (*)     RDW 14.6 (*)     ABS.  EOSINOPHILS 0.5 (*)     All other components within normal limits   PROTHROMBIN TIME + INR - Abnormal; Notable for the following components:    INR 1.6 (*)     Prothrombin time 15.2 (*)     All other components within normal limits   URINALYSIS W/MICROSCOPIC - Abnormal; Notable for the following components:    Blood TRACE (*)     All other components within normal limits   DRUG SCREEN, URINE - Abnormal; Notable for the following components:    BENZODIAZEPINES Positive (*)     OPIATES Positive (*)     All other components within normal limits       All Micro Results       Procedure Component Value Units Date/Time    CULTURE, BLOOD [[id number]] Collected: 09/24/22 0120    Order Status: Sent Specimen: Blood Updated: 09/24/22 1629    CULTURE, BLOOD [[id number]] Collected: 09/24/22 0120    Order Status: Sent Specimen: Blood Updated: 09/24/22 1629    ENTERIC BACTERIA PANEL, DNA [[id number]] Collected: 09/24/22 1506    Order Status: Sent Specimen: Stool Updated: 09/24/22 1519    CULTURE, RESPIRATORY/SPUTUM/BRONCH [place] STAIN [[id number]] Collected: 09/24/22 1506    Order Status: Sent Specimen: Sputum Updated: 09/24/22 1519    CULTURE, MRSA [[id number]] Collected: 09/24/22 1348    Order Status: Sent Specimen: Nasal Updated: 09/24/22 1405    URINE CULTURE HOLD SAMPLE [[id number]] Collected: 09/24/22 0454    Order Status: Completed Specimen: Urine from Serum Updated: 09/24/22 0457     Urine culture hold       Urine on hold in Microbiology dept for 2 days. If unpreserved urine is submitted, it cannot be used for addtional testing after 24 hours, recollection will be required. COVID-19 RAPID TEST [[id number]] Collected: 09/24/22 0023    Order Status: Completed Specimen: Nasopharyngeal Updated: 09/24/22 0050     Specimen source Nasopharyngeal        COVID-19 rapid test Not detected        Comment: Rapid Abbott ID Now       Rapid NAAT:  The specimen is NEGATIVE for SARS-CoV-2, the novel coronavirus associated with COVID-19. Negative results should be treated as presumptive and, if inconsistent with clinical signs and symptoms or necessary for patient management, should be tested with an alternative molecular assay. Negative results do not preclude SARS-CoV-2 infection and should not be used as the sole basis for patient management decisions. This test has been authorized by the FDA under an Emergency Use Authorization (EUA) for use by authorized laboratories. Fact sheet for Healthcare Providers: ConventionUpdate.co.nz  Fact sheet for Patients: ConventionUpdate.co.nz       Methodology: Isothermal Nucleic Acid Amplification                 IMAGING:   CT HEAD WO CONT   Final Result   No acute intracranial process.             CT ABD PELV W CONT   Final Result      1. Patchy bilateral airspace disease is compatible with pneumonia. 2. Mildly enlarged bilateral hilar lymph nodes are likely reactive. 3. Wall thickening in the sigmoid colon suggest colitis. 4. Small hiatal hernia. 5. Punctate bilateral nonobstructing renal calculi. 6. Chronic appearing L1   compression deformity. CT CHEST W CONT   Final Result      1. Patchy bilateral airspace disease is compatible with pneumonia. 2. Mildly enlarged bilateral hilar lymph nodes are likely reactive. 3. Wall thickening in the sigmoid colon suggest colitis. 4. Small hiatal hernia. 5. Punctate bilateral nonobstructing renal calculi. 6. Chronic appearing L1   compression deformity. XR CHEST PORT   Final Result   New bibasilar airspace disease may represent atelectasis or   pneumonia. Radiographic follow-up is recommended to assure resolution. ECG/ECHO:    Results for orders placed or performed during the hospital encounter of 09/23/22   EKG, 12 LEAD, INITIAL   Result Value Ref Range    Ventricular Rate 46 BPM    Atrial Rate 46 BPM    P-R Interval 222 ms    QRS Duration 88 ms    Q-T Interval 472 ms    QTC Calculation (Bezet) 413 ms    Calculated P Axis 18 degrees    Calculated R Axis 29 degrees    Calculated T Axis 10 degrees    Diagnosis       Sinus bradycardia with 1st degree AV block  Otherwise normal ECG  When compared with ECG of 15-APR-2020 00:39,  KS interval has increased  Vent. rate has decreased BY  25 BPM  Confirmed by Ayana Hunt MD, Calr Brown ([id number]) on 9/24/2022 5:48:55 PM          Assessment:   Given the patient's current clinical presentation, there is a high level of concern for decompensation if discharged from the emergency department. Complex decision making was performed, which includes reviewing the patient's available past medical records, laboratory results, and imaging studies.     Active Problems:    Colitis (9/24/2022)      Pneumonia (9/24/2022)  Intractable Nausea, vomiting and diarrhea    Abdominal pain    Hypokalemia    DVT LLE, coumadin, subtherapeutic anticoagulation    Chronic pain syndrome    Hypertension  Plan:     The patient will be admitted to medicine telemetry unit is inpatient and will require at least 2 midnight for inpatient treatment. Antibiotic Levaquin Flagyl and vancomycin were initiated and will be continued  Blood culture, sputum culture, stool culture was requested still pending  Home medication will be reinstated  Electrolytes will be replaced  Fluids IV for hydration will be provided  Pharmacy will be consulted for medication dose adjustment including Coumadin and vancomycin  MRSA screen will be done        DIET: ADULT DIET Full Liquid   ISOLATION PRECAUTIONS: There are currently no Active Isolations  CODE STATUS: Full Code   DVT PROPHYLAXIS: Coumadin  FUNCTIONAL STATUS PRIOR TO HOSPITALIZATION: Fully active and ambulatory; able to carry on all self-care without restriction. Ambulatory status/function: By self   EARLY MOBILITY ASSESSMENT: Recommend routine ambulation while hospitalized with the assistance of nursing staff  ANTICIPATED DISCHARGE: 24-48 hours. ANTICIPATED DISPOSITION: Home  EMERGENCY CONTACT/SURROGATE DECISION MAKER: wife    CRITICAL CARE WAS PERFORMED FOR THIS ENCOUNTER: NO.      Signed By: Arthur Boles MD     September 24, 2022         Please note that this dictation may have been completed with Dragon, the [employer] voice recognition software. Quite often unanticipated grammatical, syntax, homophones, and other interpretive errors are inadvertently transcribed by the computer software. Please disregard these errors. Please excuse any errors that have escaped final proofreading.

## 2022-09-24 NOTE — PROGRESS NOTES
Occupational Therapy  9/24/2022    Orders received and chart reviewed. Pt seen by PT who reported pt is independent at baseline and walked 250 feet independently during PT session. Pt is without concerns for safety in ADLs and functional mobility at discharge. Will complete orders. Please reconsult if pt with functional changes.      Thank you,   Dipesh Taveras, OTR/L

## 2022-09-24 NOTE — PROGRESS NOTES
Problem: Falls - Risk of  Goal: *Absence of Falls  Description: Document Alexx Back Fall Risk and appropriate interventions in the flowsheet.   Outcome: Progressing Towards Goal  Note: Fall Risk Interventions:            Medication Interventions: Patient to call before getting OOB                   Problem: Patient Education: Go to Patient Education Activity  Goal: Patient/Family Education  Outcome: Progressing Towards Goal

## 2022-09-24 NOTE — ED NOTES
Emergency Room Nursing Communication Tool        Verbal shift change report given to Quinn Curry RN (incoming nurse) by Kurt Wood RN (outgoing nurse) on Sydney Paris a 62 y.o. male and born 1965 who arrived at the hospital on 9/23/2022 11:50 PM. Report included the following information SBAR, Kardex, [facility], and Recent Results. Significant changes during shift: Awaiting [facility] for transport to [facility]. Issues for physician to address: none            Code Status: Full Code     Chief Complaint: Diarrhea and Abdominal Pain     Admit Diagnosis: Colitis [K52.9]  Pneumonia [J18.9]     Admitting Provider: Shantal Ramos MD     Surgery: * No surgery found *     Infections: No current active infections     Allergies: Pcn [penicillins]     Current diet: No diet orders on file     Lines:   Peripheral IV 09/24/22 Right Antecubital (Active)       Peripheral IV 09/24/22 Right Antecubital (Active)   Site Assessment Clean, dry, & intact 09/24/22 0009   Phlebitis Assessment 0 09/24/22 0009   Infiltration Assessment 0 09/24/22 0009   Dressing Status Clean, dry, & intact 09/24/22 0009   Hub Color/Line Status Pink;Patent; Flushed 09/24/22 0009                Vital Signs:   Patient Vitals for the past 12 hrs:   Temp Pulse Resp BP SpO2   09/24/22 0700 -- (!) 48 18 129/80 98 %   09/24/22 0630 -- -- 19 123/73 99 %   09/24/22 0615 -- -- 22 112/67 99 %   09/24/22 0600 -- -- 19 112/69 98 %   09/24/22 0545 -- (!) 51 18 112/66 100 %   09/24/22 0530 -- -- -- 110/77 99 %   09/24/22 0515 -- -- -- 119/72 99 %   09/24/22 0500 -- -- -- 118/71 100 %   09/24/22 0415 -- -- -- 105/65 98 %   09/24/22 0400 -- -- -- 120/67 99 %   09/24/22 0345 -- -- -- 109/63 100 %   09/24/22 0330 -- -- -- 101/61 97 %   09/24/22 0315 -- -- -- 106/61 97 %   09/24/22 0245 -- -- -- (!) 93/53 98 %   09/24/22 0215 -- -- -- 93/68 99 %   09/24/22 0200 -- -- -- 97/63 97 %   09/24/22 0145 -- -- -- 102/67 98 %   09/24/22 0140 -- -- -- 107/64 -- 09/24/22 0115 -- -- -- (!) 105/53 96 %   09/24/22 0110 -- -- -- -- 94 %   09/24/22 0105 -- -- -- -- 96 %   09/24/22 0100 -- -- -- (!) 93/55 96 %   09/24/22 0055 -- -- -- (!) 84/59 96 %   09/24/22 0050 -- -- -- (!) 82/51 93 %   09/24/22 0045 -- -- -- (!) 76/49 93 %   09/24/22 0005 -- -- -- -- 95 %   09/24/22 0000 -- -- -- (!) 102/59 95 %   09/23/22 2355 97.6 °F (36.4 °C) (!) 52 16 (!) 91/59 97 %      Intake & Output:     Intake/Output Summary (Last 24 hours) at 9/24/2022 0732  Last data filed at 9/24/2022 0555  Gross per 24 hour   Intake 2250 ml   Output --   Net 2250 ml      Laboratory Results:     Recent Results (from the past 12 hour(s))   SAMPLES BEING HELD    Collection Time: 09/24/22 12:03 AM   Result Value Ref Range    SAMPLES BEING HELD RED,BLUE,PST,LAV,GRAY,BLDCS     COMMENT        Add-on orders for these samples will be processed based on acceptable specimen integrity and analyte stability, which may vary by analyte. METABOLIC PANEL, COMPREHENSIVE    Collection Time: 09/24/22 12:05 AM   Result Value Ref Range    Sodium 138 136 - 145 mmol/L    Potassium 3.3 (L) 3.5 - 5.1 mmol/L    Chloride 104 97 - 108 mmol/L    CO2 26 21 - 32 mmol/L    Anion gap 8 5 - 15 mmol/L    Glucose 123 (H) 65 - 100 mg/dL    BUN 14 6 - 20 MG/DL    Creatinine 1.16 0.70 - 1.30 MG/DL    BUN/Creatinine ratio 12 12 - 20      GFR est AA >60 >60 ml/min/1.73m2    GFR est non-AA >60 >60 ml/min/1.73m2    Calcium 8.7 8.5 - 10.1 MG/DL    Bilirubin, total 0.4 0.2 - 1.0 MG/DL    ALT (SGPT) 7 (L) 12 - 78 U/L    AST (SGOT) 15 15 - 37 U/L    Alk.  phosphatase 60 45 - 117 U/L    Protein, total 7.2 6.4 - 8.2 g/dL    Albumin 3.4 (L) 3.5 - 5.0 g/dL    Globulin 3.8 2.0 - 4.0 g/dL    A-G Ratio 0.9 (L) 1.1 - 2.2     LACTIC ACID    Collection Time: 09/24/22 12:05 AM   Result Value Ref Range    Lactic acid 0.6 0.4 - 2.0 MMOL/L   LIPASE    Collection Time: 09/24/22 12:05 AM   Result Value Ref Range    Lipase 52 (L) 73 - 393 U/L   CBC WITH AUTOMATED DIFF Collection Time: 09/24/22 12:05 AM   Result Value Ref Range    WBC 10.8 4.1 - 11.1 K/uL    RBC 3.98 (L) 4.10 - 5.70 M/uL    HGB 10.8 (L) 12.1 - 17.0 g/dL    HCT 34.2 (L) 36.6 - 50.3 %    MCV 85.9 80.0 - 99.0 FL    MCH 27.1 26.0 - 34.0 PG    MCHC 31.6 30.0 - 36.5 g/dL    RDW 14.6 (H) 11.5 - 14.5 %    PLATELET 001 832 - 228 K/uL    MPV 9.7 8.9 - 12.9 FL    NRBC 0.0 0  WBC    ABSOLUTE NRBC 0.00 0.00 - 0.01 K/uL    NEUTROPHILS 60 32 - 75 %    LYMPHOCYTES 27 12 - 49 %    MONOCYTES 8 5 - 13 %    EOSINOPHILS 5 0 - 7 %    BASOPHILS 0 0 - 1 %    IMMATURE GRANULOCYTES 0 0.0 - 0.5 %    ABS. NEUTROPHILS 6.5 1.8 - 8.0 K/UL    ABS. LYMPHOCYTES 2.9 0.8 - 3.5 K/UL    ABS. MONOCYTES 0.8 0.0 - 1.0 K/UL    ABS. EOSINOPHILS 0.5 (H) 0.0 - 0.4 K/UL    ABS. BASOPHILS 0.0 0.0 - 0.1 K/UL    ABS. IMM. GRANS. 0.0 0.00 - 0.04 K/UL    DF AUTOMATED     PROTHROMBIN TIME + INR    Collection Time: 09/24/22 12:05 AM   Result Value Ref Range    INR 1.6 (H) 0.9 - 1.1      Prothrombin time 15.2 (H) 9.0 - 11.1 sec   ETHYL ALCOHOL    Collection Time: 09/24/22 12:05 AM   Result Value Ref Range    ALCOHOL(ETHYL),SERUM <10 <10 MG/DL   COVID-19 RAPID TEST    Collection Time: 09/24/22 12:23 AM   Result Value Ref Range    Specimen source Nasopharyngeal      COVID-19 rapid test Not detected NOTD     INFLUENZA A+B VIRAL AGS    Collection Time: 09/24/22 12:23 AM   Result Value Ref Range    Influenza A Antigen Negative NEG      Influenza B Antigen Negative NEG     EKG, 12 LEAD, INITIAL    Collection Time: 09/24/22  1:14 AM   Result Value Ref Range    Ventricular Rate 46 BPM    Atrial Rate 46 BPM    P-R Interval 222 ms    QRS Duration 88 ms    Q-T Interval 472 ms    QTC Calculation (Bezet) 413 ms    Calculated P Axis 18 degrees    Calculated R Axis 29 degrees    Calculated T Axis 10 degrees    Diagnosis       Sinus bradycardia with 1st degree AV block  Otherwise normal ECG  When compared with ECG of 15-APR-2020 00:39,  VT interval has increased  Vent.  rate has decreased BY  25 BPM     TROPONIN-HIGH SENSITIVITY    Collection Time: 09/24/22  1:20 AM   Result Value Ref Range    Troponin-High Sensitivity 10 0 - 76 ng/L   URINALYSIS W/MICROSCOPIC    Collection Time: 09/24/22  4:54 AM   Result Value Ref Range    Color YELLOW/STRAW      Appearance CLEAR CLEAR      Specific gravity 1.015 1.003 - 1.030      pH (UA) 6.0 5.0 - 8.0      Protein Negative NEG mg/dL    Glucose Negative NEG mg/dL    Ketone Negative NEG mg/dL    Bilirubin Negative NEG      Blood TRACE (A) NEG      Urobilinogen 0.2 0.2 - 1.0 EU/dL    Nitrites Negative NEG      Leukocyte Esterase Negative NEG      WBC 0-4 0 - 4 /hpf    RBC 5-10 0 - 5 /hpf    Epithelial cells FEW FEW /lpf    Bacteria Negative NEG /hpf   URINE CULTURE HOLD SAMPLE    Collection Time: 09/24/22  4:54 AM    Specimen: Serum; Urine   Result Value Ref Range    Urine culture hold        Urine on hold in Microbiology dept for 2 days. If unpreserved urine is submitted, it cannot be used for addtional testing after 24 hours, recollection will be required. DRUG SCREEN, URINE    Collection Time: 09/24/22  4:54 AM   Result Value Ref Range    AMPHETAMINES Negative NEG      BARBITURATES Negative NEG      BENZODIAZEPINES Positive (A) NEG      COCAINE Negative NEG      METHADONE Negative NEG      OPIATES Positive (A) NEG      PCP(PHENCYCLIDINE) Negative NEG      THC (TH-CANNABINOL) Negative NEG      Drug screen comment (NOTE)             Opportunity for questions and clarifications were given to the incoming nurse. Patient's bed locked and is in low position, side rails up x2, door open PRN, call bell within reach of patient and patient not in distress.       Signed by: Raudel Whaley RN, KELLIE, BSN, [facility]                       9/24/2022 at 7:32 AM

## 2022-09-24 NOTE — ED NOTES
Verbal report and paperwork given to [facility] provider, Santy Mera., Paramedic; time allotted for questions but denied having any at this time. Pt continues to sleep. Pt transported out of [facility] ED 01 to [facility] via [facility] stretcher.

## 2022-09-24 NOTE — ROUTINE PROCESS
Emergency Room Outgoing Transfer Nursing Note      Verbal and/or Written report given to Elisa Salas RN by Ira Lopez RN on Chantal Lozano a 62 y.o. male who was admitted on 9/23/2022 11:50 PM and being transferred for routine progression of care. Report consisted of the following information SBAR, Kardex, MAR, and Recent Results and the information was reviewed with the receiving nurse. Code Status: Full Code      Chief Complaint: Diarrhea and Abdominal Pain      Admit Diagnosis: Colitis [K52.9]  Pneumonia [J18.9]      Admitting Provider: Rashawn Combs MD      Surgery: * No surgery found *       Infections: No current active infections      Allergies: Pcn [penicillins]      Current diet: No diet orders on file      Lines:   Peripheral IV 09/24/22 Right Antecubital (Active)       Peripheral IV 09/24/22 Right Antecubital (Active)   Site Assessment Clean, dry, & intact 09/24/22 0009   Phlebitis Assessment 0 09/24/22 0009   Infiltration Assessment 0 09/24/22 0009   Dressing Status Clean, dry, & intact 09/24/22 0009   Hub Color/Line Status Pink;Patent; Flushed 09/24/22 0009                Vital Signs:   Patient Vitals for the past 12 hrs:   Temp Pulse Resp BP SpO2   09/24/22 0615 -- -- 22 112/67 99 %   09/24/22 0600 -- -- 19 112/69 98 %   09/24/22 0545 -- (!) 51 18 112/66 100 %   09/24/22 0530 -- -- -- 110/77 99 %   09/24/22 0515 -- -- -- 119/72 99 %   09/24/22 0500 -- -- -- 118/71 100 %   09/24/22 0415 -- -- -- 105/65 98 %   09/24/22 0400 -- -- -- 120/67 99 %   09/24/22 0345 -- -- -- 109/63 100 %   09/24/22 0330 -- -- -- 101/61 97 %   09/24/22 0315 -- -- -- 106/61 97 %   09/24/22 0245 -- -- -- (!) 93/53 98 %   09/24/22 0215 -- -- -- 93/68 99 %   09/24/22 0200 -- -- -- 97/63 97 %   09/24/22 0145 -- -- -- 102/67 98 %   09/24/22 0140 -- -- -- 107/64 --   09/24/22 0115 -- -- -- (!) 105/53 96 %   09/24/22 0110 -- -- -- -- 94 %   09/24/22 0105 -- -- -- -- 96 %   09/24/22 0100 -- -- -- Gisselle Keen 93/55 96 %   09/24/22 0055 -- -- -- (!) 84/59 96 %   09/24/22 0050 -- -- -- (!) 82/51 93 %   09/24/22 0045 -- -- -- (!) 76/49 93 %   09/24/22 0005 -- -- -- -- 95 %   09/24/22 0000 -- -- -- (!) 102/59 95 %   09/23/22 2355 97.6 °F (36.4 °C) (!) 52 16 (!) 91/59 97 %           Intake & Output:     Intake/Output Summary (Last 24 hours) at 9/24/2022 4909  Last data filed at 9/24/2022 0555  Gross per 24 hour   Intake 2250 ml   Output --   Net 2250 ml          Laboratory Results:     Recent Results (from the past 12 hour(s))   SAMPLES BEING HELD    Collection Time: 09/24/22 12:03 AM   Result Value Ref Range    SAMPLES BEING HELD RED,BLUE,PST,LAV,GRAY,BLDCS     COMMENT        Add-on orders for these samples will be processed based on acceptable specimen integrity and analyte stability, which may vary by analyte. METABOLIC PANEL, COMPREHENSIVE    Collection Time: 09/24/22 12:05 AM   Result Value Ref Range    Sodium 138 136 - 145 mmol/L    Potassium 3.3 (L) 3.5 - 5.1 mmol/L    Chloride 104 97 - 108 mmol/L    CO2 26 21 - 32 mmol/L    Anion gap 8 5 - 15 mmol/L    Glucose 123 (H) 65 - 100 mg/dL    BUN 14 6 - 20 MG/DL    Creatinine 1.16 0.70 - 1.30 MG/DL    BUN/Creatinine ratio 12 12 - 20      GFR est AA >60 >60 ml/min/1.73m2    GFR est non-AA >60 >60 ml/min/1.73m2    Calcium 8.7 8.5 - 10.1 MG/DL    Bilirubin, total 0.4 0.2 - 1.0 MG/DL    ALT (SGPT) 7 (L) 12 - 78 U/L    AST (SGOT) 15 15 - 37 U/L    Alk.  phosphatase 60 45 - 117 U/L    Protein, total 7.2 6.4 - 8.2 g/dL    Albumin 3.4 (L) 3.5 - 5.0 g/dL    Globulin 3.8 2.0 - 4.0 g/dL    A-G Ratio 0.9 (L) 1.1 - 2.2     LACTIC ACID    Collection Time: 09/24/22 12:05 AM   Result Value Ref Range    Lactic acid 0.6 0.4 - 2.0 MMOL/L   LIPASE    Collection Time: 09/24/22 12:05 AM   Result Value Ref Range    Lipase 52 (L) 73 - 393 U/L   CBC WITH AUTOMATED DIFF    Collection Time: 09/24/22 12:05 AM   Result Value Ref Range    WBC 10.8 4.1 - 11.1 K/uL    RBC 3.98 (L) 4.10 - 5.70 M/uL    HGB 10.8 (L) 12.1 - 17.0 g/dL    HCT 34.2 (L) 36.6 - 50.3 %    MCV 85.9 80.0 - 99.0 FL    MCH 27.1 26.0 - 34.0 PG    MCHC 31.6 30.0 - 36.5 g/dL    RDW 14.6 (H) 11.5 - 14.5 %    PLATELET 769 211 - 539 K/uL    MPV 9.7 8.9 - 12.9 FL    NRBC 0.0 0  WBC    ABSOLUTE NRBC 0.00 0.00 - 0.01 K/uL    NEUTROPHILS 60 32 - 75 %    LYMPHOCYTES 27 12 - 49 %    MONOCYTES 8 5 - 13 %    EOSINOPHILS 5 0 - 7 %    BASOPHILS 0 0 - 1 %    IMMATURE GRANULOCYTES 0 0.0 - 0.5 %    ABS. NEUTROPHILS 6.5 1.8 - 8.0 K/UL    ABS. LYMPHOCYTES 2.9 0.8 - 3.5 K/UL    ABS. MONOCYTES 0.8 0.0 - 1.0 K/UL    ABS. EOSINOPHILS 0.5 (H) 0.0 - 0.4 K/UL    ABS. BASOPHILS 0.0 0.0 - 0.1 K/UL    ABS. IMM. GRANS. 0.0 0.00 - 0.04 K/UL    DF AUTOMATED     PROTHROMBIN TIME + INR    Collection Time: 09/24/22 12:05 AM   Result Value Ref Range    INR 1.6 (H) 0.9 - 1.1      Prothrombin time 15.2 (H) 9.0 - 11.1 sec   ETHYL ALCOHOL    Collection Time: 09/24/22 12:05 AM   Result Value Ref Range    ALCOHOL(ETHYL),SERUM <10 <10 MG/DL   COVID-19 RAPID TEST    Collection Time: 09/24/22 12:23 AM   Result Value Ref Range    Specimen source Nasopharyngeal      COVID-19 rapid test Not detected NOTD     INFLUENZA A+B VIRAL AGS    Collection Time: 09/24/22 12:23 AM   Result Value Ref Range    Influenza A Antigen Negative NEG      Influenza B Antigen Negative NEG     EKG, 12 LEAD, INITIAL    Collection Time: 09/24/22  1:14 AM   Result Value Ref Range    Ventricular Rate 46 BPM    Atrial Rate 46 BPM    P-R Interval 222 ms    QRS Duration 88 ms    Q-T Interval 472 ms    QTC Calculation (Bezet) 413 ms    Calculated P Axis 18 degrees    Calculated R Axis 29 degrees    Calculated T Axis 10 degrees    Diagnosis       Sinus bradycardia with 1st degree AV block  Otherwise normal ECG  When compared with ECG of 15-APR-2020 00:39,  OK interval has increased  Vent.  rate has decreased BY  25 BPM     TROPONIN-HIGH SENSITIVITY    Collection Time: 09/24/22  1:20 AM   Result Value Ref Range Troponin-High Sensitivity 10 0 - 76 ng/L   URINALYSIS W/MICROSCOPIC    Collection Time: 09/24/22  4:54 AM   Result Value Ref Range    Color YELLOW/STRAW      Appearance CLEAR CLEAR      Specific gravity 1.015 1.003 - 1.030      pH (UA) 6.0 5.0 - 8.0      Protein Negative NEG mg/dL    Glucose Negative NEG mg/dL    Ketone Negative NEG mg/dL    Bilirubin Negative NEG      Blood TRACE (A) NEG      Urobilinogen 0.2 0.2 - 1.0 EU/dL    Nitrites Negative NEG      Leukocyte Esterase Negative NEG      WBC 0-4 0 - 4 /hpf    RBC 5-10 0 - 5 /hpf    Epithelial cells FEW FEW /lpf    Bacteria Negative NEG /hpf   URINE CULTURE HOLD SAMPLE    Collection Time: 09/24/22  4:54 AM    Specimen: Serum; Urine   Result Value Ref Range    Urine culture hold        Urine on hold in Microbiology dept for 2 days. If unpreserved urine is submitted, it cannot be used for addtional testing after 24 hours, recollection will be required. DRUG SCREEN, URINE    Collection Time: 09/24/22  4:54 AM   Result Value Ref Range    AMPHETAMINES Negative NEG      BARBITURATES Negative NEG      BENZODIAZEPINES Positive (A) NEG      COCAINE Negative NEG      METHADONE Negative NEG      OPIATES Positive (A) NEG      PCP(PHENCYCLIDINE) Negative NEG      THC (TH-CANNABINOL) Negative NEG      Drug screen comment (NOTE)            Patient transported with : Monitor  Tech     Opportunity for questions and clarifications were provided.          William Watson RN, KELLIE, BSN, VIA [facility]     9/24/2022, 6:39 AM

## 2022-09-24 NOTE — PROGRESS NOTES
PHYSICAL THERAPY EVALUATION/DISCHARGE  Patient: Linette Sexton (64 y.o. male)  Date: 9/24/2022  Primary Diagnosis: Colitis [K52.9]  Pneumonia [J18.9]       Precautions:          ASSESSMENT  Based on the objective data described below, the patient presents with baseline strength, balance, and endurance following admission for PNA and colitis. Patient is independent and active without DME at baseline. Per chart, he was ambulatory to the ED. He reports a fall ~1 month ago when \"delirious\" from PNA leading to a rib fracture. During eval, he demonstrated independence and gait training completed x250' independently. He ascendend/descended 14 stairs modified independently. SpO2 95% on RA and HR up to 113 after stair training without overt THOMPSON. Patient is at his independent baseline and no therapy needs identified. He reports recurrent PNA and encouraged him to spend increasing time OOB to aide in lung clearance. Further skilled acute physical therapy is not indicated at this time. PLAN :  Recommendation for discharge: (in order for the patient to meet his/her long term goals)  No skilled physical therapy/ follow up rehabilitation needs identified at this time. This discharge recommendation:  Has not yet been discussed the attending provider and/or case management    IF patient discharges home will need the following DME: none       SUBJECTIVE:   Patient stated I was driving yesterday.     OBJECTIVE DATA SUMMARY:   HISTORY:    Past Medical History:   Diagnosis Date    Acid reflux     Anxiety     Baker's cyst     DVT (deep venous thrombosis) (HCC)     Hypertension     Restless leg     Restless leg syndrome      Past Surgical History:   Procedure Laterality Date    HX ORTHOPAEDIC      multiple surgeries to his left leg    IR DILATE ESOPH STRICT      IR IVC FILTER         Prior level of function: independent for mobility and ADLs  Personal factors and/or comorbidities impacting plan of care:     Home Situation  Home Environment: Private residence  # Steps to Enter: 3  One/Two Story Residence: One story  Living Alone: No  Support Systems: Spouse/Significant Other, Child(arden)  Patient Expects to be Discharged to[de-identified] Home  Current DME Used/Available at Home: Nebulizer    EXAMINATION/PRESENTATION/DECISION MAKING:   Critical Behavior:  Neurologic State: Alert  Orientation Level: Oriented X4  Cognition: Appropriate decision making, Follows commands     Hearing:   Auditory  Auditory Impairment: None  Skin:    Edema:   Range Of Motion:  AROM: Generally decreased, functional                       Strength:    Strength: Generally decreased, functional                    Tone & Sensation:   Tone: Normal                              Coordination:  Coordination: Within functional limits  Vision:      Functional Mobility:  Bed Mobility:  Rolling: Independent  Supine to Sit: Independent  Sit to Supine: Independent  Scooting: Independent  Transfers:  Sit to Stand: Modified independent  Stand to Sit: Independent                       Balance:   Sitting: Intact  Standing: Intact  Ambulation/Gait Training:  Distance (ft): 250 Feet (ft)     Ambulation - Level of Assistance: Independent     Gait Description (WDL): Exceptions to WDL  Gait Abnormalities: Altered arm swing        Base of Support: Widened     Speed/Mony: Accelerated   Increased trunk sway d/t left leg length discrepancy    Stairs:  Number of Stairs Trained: 14   Completed modified independently   Rail Use: Left     Therapeutic Exercises:       Functional Measure:         Physical Therapy Evaluation Charge Determination   History Examination Presentation Decision-Making   MEDIUM  Complexity : 1-2 comorbidities / personal factors will impact the outcome/ POC  LOW Complexity : 1-2 Standardized tests and measures addressing body structure, function, activity limitation and / or participation in recreation  LOW Complexity : Stable, uncomplicated  LOW Complexity : FOTO score of       Based on the above components, the patient evaluation is determined to be of the following complexity level: LOW     Pain Rating:      Activity Tolerance:   Good      After treatment patient left in no apparent distress:   Supine in bed and Call bell within reach    COMMUNICATION/EDUCATION:   The patients plan of care was discussed with: Registered nurse. Fall prevention education was provided and the patient/caregiver indicated understanding., Patient/family have participated as able in goal setting and plan of care. , and Patient/family agree to work toward stated goals and plan of care.     Thank you for this referral.  Sherly Matta, PT, DPT   Time Calculation: 15 mins

## 2022-09-24 NOTE — PROGRESS NOTES
Pharmacist Note - Vancomycin Dosing    Consult provided for this 62 y.o. male for indication of Community acquired pneumonia, unspecified laterality. Antibiotic regimen(s): Vancomycin, Levaquin, Flagyl  Patient on vancomycin PTA? NO     Recent Labs     22  0005   WBC 10.8   CREA 1.16   BUN 14     Frequency of BMP: Daily  Height: 188 cm  Weight: 121.4 kg  Est CrCl: 97.3 ml/min; Temp (24hrs), Av °F (36.7 °C), Min:97.6 °F (36.4 °C), Max:99.1 °F (37.3 °C)    The plan below is expected to result in a target range of AUC/MURTAZA 400-600    Therapy will be initiated with a loading dose of 2500 mg IV x 1 to be followed by a maintenance dose of 750 mg IV every 8 hours. Pharmacy to follow patient daily and order levels / make dose adjustments as appropriate. *Vancomycin has been dosed used Bayesian kinetics software to target an AUC/MURTAZA of 400-600, which provides adequate exposure for an assumed infection due to MRSA with an MURTAZA of 1 or less while reducing the risk of nephrotoxicity as seen with traditional trough based dosing goals.

## 2022-09-25 LAB
ALBUMIN SERPL-MCNC: 3.3 G/DL (ref 3.5–5)
ALBUMIN/GLOB SERPL: 0.8 {RATIO} (ref 1.1–2.2)
ALP SERPL-CCNC: 63 U/L (ref 45–117)
ALT SERPL-CCNC: 13 U/L (ref 12–78)
ANION GAP SERPL CALC-SCNC: 4 MMOL/L (ref 5–15)
AST SERPL-CCNC: 11 U/L (ref 15–37)
BACTERIA SPEC CULT: NORMAL
BACTERIA SPEC CULT: NORMAL
BASOPHILS # BLD: 0 K/UL (ref 0–0.1)
BASOPHILS NFR BLD: 0 % (ref 0–1)
BILIRUB SERPL-MCNC: 0.4 MG/DL (ref 0.2–1)
BUN SERPL-MCNC: 6 MG/DL (ref 6–20)
BUN/CREAT SERPL: 7 (ref 12–20)
CALCIUM SERPL-MCNC: 8.7 MG/DL (ref 8.5–10.1)
CHLORIDE SERPL-SCNC: 115 MMOL/L (ref 97–108)
CO2 SERPL-SCNC: 22 MMOL/L (ref 21–32)
CREAT SERPL-MCNC: 0.91 MG/DL (ref 0.7–1.3)
DIFFERENTIAL METHOD BLD: NORMAL
EOSINOPHIL # BLD: 0.4 K/UL (ref 0–0.4)
EOSINOPHIL NFR BLD: 5 % (ref 0–7)
ERYTHROCYTE [DISTWIDTH] IN BLOOD BY AUTOMATED COUNT: 14.5 % (ref 11.5–14.5)
GLOBULIN SER CALC-MCNC: 4.1 G/DL (ref 2–4)
GLUCOSE SERPL-MCNC: 100 MG/DL (ref 65–100)
HCT VFR BLD AUTO: 38.6 % (ref 36.6–50.3)
HGB BLD-MCNC: 12.5 G/DL (ref 12.1–17)
IMM GRANULOCYTES # BLD AUTO: 0 K/UL (ref 0–0.04)
IMM GRANULOCYTES NFR BLD AUTO: 0 % (ref 0–0.5)
INR PPP: 1.7 (ref 0.9–1.1)
LACTATE SERPL-SCNC: 1.2 MMOL/L (ref 0.4–2)
LYMPHOCYTES # BLD: 2 K/UL (ref 0.8–3.5)
LYMPHOCYTES NFR BLD: 25 % (ref 12–49)
MAGNESIUM SERPL-MCNC: 1.9 MG/DL (ref 1.6–2.4)
MCH RBC QN AUTO: 27.4 PG (ref 26–34)
MCHC RBC AUTO-ENTMCNC: 32.4 G/DL (ref 30–36.5)
MCV RBC AUTO: 84.5 FL (ref 80–99)
MONOCYTES # BLD: 0.4 K/UL (ref 0–1)
MONOCYTES NFR BLD: 5 % (ref 5–13)
NEUTS SEG # BLD: 5.2 K/UL (ref 1.8–8)
NEUTS SEG NFR BLD: 65 % (ref 32–75)
NRBC # BLD: 0 K/UL (ref 0–0.01)
NRBC BLD-RTO: 0 PER 100 WBC
PLATELET # BLD AUTO: 278 K/UL (ref 150–400)
PMV BLD AUTO: 9.8 FL (ref 8.9–12.9)
POTASSIUM SERPL-SCNC: 3.1 MMOL/L (ref 3.5–5.1)
PROT SERPL-MCNC: 7.4 G/DL (ref 6.4–8.2)
PROTHROMBIN TIME: 17.2 SEC (ref 9–11.1)
RBC # BLD AUTO: 4.57 M/UL (ref 4.1–5.7)
SERVICE CMNT-IMP: NORMAL
SODIUM SERPL-SCNC: 141 MMOL/L (ref 136–145)
WBC # BLD AUTO: 8 K/UL (ref 4.1–11.1)

## 2022-09-25 PROCEDURE — 36415 COLL VENOUS BLD VENIPUNCTURE: CPT

## 2022-09-25 PROCEDURE — 74011250637 HC RX REV CODE- 250/637: Performed by: INTERNAL MEDICINE

## 2022-09-25 PROCEDURE — 74011250636 HC RX REV CODE- 250/636: Performed by: PHYSICIAN ASSISTANT

## 2022-09-25 PROCEDURE — 83605 ASSAY OF LACTIC ACID: CPT

## 2022-09-25 PROCEDURE — 74011250636 HC RX REV CODE- 250/636: Performed by: FAMILY MEDICINE

## 2022-09-25 PROCEDURE — 85610 PROTHROMBIN TIME: CPT

## 2022-09-25 PROCEDURE — 65270000046 HC RM TELEMETRY

## 2022-09-25 PROCEDURE — 74011250636 HC RX REV CODE- 250/636: Performed by: INTERNAL MEDICINE

## 2022-09-25 PROCEDURE — 74011000250 HC RX REV CODE- 250: Performed by: INTERNAL MEDICINE

## 2022-09-25 PROCEDURE — 92610 EVALUATE SWALLOWING FUNCTION: CPT

## 2022-09-25 PROCEDURE — 83735 ASSAY OF MAGNESIUM: CPT

## 2022-09-25 PROCEDURE — 80053 COMPREHEN METABOLIC PANEL: CPT

## 2022-09-25 PROCEDURE — 85025 COMPLETE CBC W/AUTO DIFF WBC: CPT

## 2022-09-25 RX ORDER — WARFARIN SODIUM 5 MG/1
5 TABLET ORAL ONCE
Status: COMPLETED | OUTPATIENT
Start: 2022-09-25 | End: 2022-09-25

## 2022-09-25 RX ORDER — VALSARTAN 80 MG/1
80 TABLET ORAL DAILY
Status: DISCONTINUED | OUTPATIENT
Start: 2022-09-25 | End: 2022-09-27 | Stop reason: HOSPADM

## 2022-09-25 RX ADMIN — TIZANIDINE 4 MG: 4 TABLET ORAL at 21:58

## 2022-09-25 RX ADMIN — VANCOMYCIN HYDROCHLORIDE 750 MG: 750 INJECTION, POWDER, LYOPHILIZED, FOR SOLUTION INTRAVENOUS at 07:49

## 2022-09-25 RX ADMIN — TIZANIDINE 4 MG: 4 TABLET ORAL at 09:07

## 2022-09-25 RX ADMIN — METRONIDAZOLE 500 MG: 500 INJECTION, SOLUTION INTRAVENOUS at 03:03

## 2022-09-25 RX ADMIN — PANTOPRAZOLE SODIUM 40 MG: 40 TABLET, DELAYED RELEASE ORAL at 07:30

## 2022-09-25 RX ADMIN — SODIUM CHLORIDE 75 ML/HR: 9 INJECTION, SOLUTION INTRAVENOUS at 22:04

## 2022-09-25 RX ADMIN — CARBIDOPA AND LEVODOPA 1 TABLET: 25; 100 TABLET ORAL at 21:58

## 2022-09-25 RX ADMIN — CARBIDOPA AND LEVODOPA 1 TABLET: 25; 100 TABLET ORAL at 09:07

## 2022-09-25 RX ADMIN — LEVOFLOXACIN 750 MG: 5 INJECTION, SOLUTION INTRAVENOUS at 05:44

## 2022-09-25 RX ADMIN — SODIUM CHLORIDE, PRESERVATIVE FREE 10 ML: 5 INJECTION INTRAVENOUS at 05:45

## 2022-09-25 RX ADMIN — HYDROMORPHONE HYDROCHLORIDE 24 MG: 4 TABLET ORAL at 17:47

## 2022-09-25 RX ADMIN — VANCOMYCIN HYDROCHLORIDE 750 MG: 750 INJECTION, POWDER, LYOPHILIZED, FOR SOLUTION INTRAVENOUS at 17:46

## 2022-09-25 RX ADMIN — METRONIDAZOLE 500 MG: 500 INJECTION, SOLUTION INTRAVENOUS at 13:19

## 2022-09-25 RX ADMIN — SODIUM CHLORIDE, PRESERVATIVE FREE 10 ML: 5 INJECTION INTRAVENOUS at 21:58

## 2022-09-25 RX ADMIN — TIZANIDINE 4 MG: 4 TABLET ORAL at 13:19

## 2022-09-25 RX ADMIN — VALSARTAN 80 MG: 80 TABLET ORAL at 13:48

## 2022-09-25 RX ADMIN — HYDROMORPHONE HYDROCHLORIDE 4 MG: 2 TABLET ORAL at 15:47

## 2022-09-25 RX ADMIN — WARFARIN SODIUM 5 MG: 5 TABLET ORAL at 16:10

## 2022-09-25 RX ADMIN — CARBIDOPA AND LEVODOPA 1 TABLET: 25; 100 TABLET ORAL at 16:10

## 2022-09-25 RX ADMIN — HYDRALAZINE HYDROCHLORIDE 10 MG: 20 INJECTION INTRAMUSCULAR; INTRAVENOUS at 19:38

## 2022-09-25 RX ADMIN — SODIUM CHLORIDE, PRESERVATIVE FREE 10 ML: 5 INJECTION INTRAVENOUS at 13:48

## 2022-09-25 RX ADMIN — HYDROMORPHONE HYDROCHLORIDE 24 MG: 4 TABLET ORAL at 05:47

## 2022-09-25 RX ADMIN — TIZANIDINE 4 MG: 4 TABLET ORAL at 19:32

## 2022-09-25 NOTE — PROGRESS NOTES
Problem: Falls - Risk of  Goal: *Absence of Falls  Description: Document Daina Pacheco Fall Risk and appropriate interventions in the flowsheet.   Outcome: Progressing Towards Goal  Note: Fall Risk Interventions:            Medication Interventions: Assess postural VS orthostatic hypotension, Bed/chair exit alarm         History of Falls Interventions: Evaluate medications/consider consulting pharmacy, Bed/chair exit alarm         Problem: Patient Education: Go to Patient Education Activity  Goal: Patient/Family Education  Outcome: Progressing Towards Goal

## 2022-09-25 NOTE — PROGRESS NOTES
Pharmacist Note - Warfarin Dosing  Consult provided for this 62 y. o.male to manage warfarin for Venous Thrombosis    INR Goal: 2 - 3    Home regimen/ tablet size: 10mg M/W/F and 5mg other days    Drugs that may increase INR: Quinolones and Metronidazole  Drugs that may decrease INR: None  Other current anticoagulants/ drugs that may increase bleeding risk: None  Risk factors: None  Daily INR ordered: YES    Recent Labs     09/25/22  0657 09/24/22  0005   HGB 12.5 10.8*   INR 1.7* 1.6*     Date               INR                  Dose  9/24     1.6              5 mg  9/25                1.7                   5 mg                                                                            Assessment/ Plan: Will order warfarin 5 mg PO x 1 dose. Noted significant DDI with Levaquin and Flagyl    Pharmacy will continue to monitor daily and adjust therapy as indicated.

## 2022-09-25 NOTE — PROGRESS NOTES
Hospitalist Progress Note      Hospital summary: Marylu Jones is a 62 y.o. male with known history of chronic pain on Dilaudid p.o. who presents to ED for evaluation of shortness of breath, chills, productive cough with greenish-yellowish sputum for 2 3 days,  also abdominal pain left lower quadrant associated with this diarrhea up to 20 times yesterday, patient denied having any bloody stool. Per patient he did not take any antibiotic recently. In the emergency department patient was evaluated CT chest was significant for bilateral pneumonia and CT abdomen was significant for sigmoid colitis. Antibiotic were initiated, medicine was consulted for admission and further evaluation. According to patient he was not able to eat for a few days due to intractable nausea vomiting and diarrhea. The patient has history of DVT in left lower extremity after fracture and surgery, he is on Coumadin, INR is subtherapeutic 1.6. Blood culture, stool culture pending 9/23/2022      Assessment/Plan:  Colitis    - pt presented with Intractable Nausea, vomiting and diarrhea, Abdominal pain   - no wbc  - CT abd: Wall thickening in the sigmoid colon suggest colitis. - c/w Levaquin, flagyl  - c/w full liquids for today, will advance tomorrow     Community Acquired Pneumonia   - CT: Patchy bilateral airspace disease is compatible with pneumonia  - rapid covid neg   - sputum cx: pending. MRSA screen pending  - c/w IV Vanco,, Levaquin   - saturating on RA      Hypokalemia - replaced      DVT LLE - c/w coumadin, subtherapeutic anticoagulation     Chronic pain syndrome- c/w home regimen dilaudid 24mg bid   Neuralgia - takes sinemet? Hypertension - c/w home regimen valsartan     Code status: Full  DVT prophylaxis:  Disposition: TBD . Home in 1-2 days   ----------------------------------------------    CC: Colitis     S: Patient is seen and examined at bedside this AM. He feels better.  He had 3 loose bms last night. No pain or nausea. Tolerating full liquids  Discussed with nursing      Review of Systems:  A comprehensive review of systems was negative. O:  Visit Vitals  BP (!) 157/89   Pulse 64   Temp 98.8 °F (37.1 °C)   Resp 18   Ht 6' 2\" (1.88 m)   Wt 121.4 kg (267 lb 10.2 oz)   SpO2 96%   BMI 34.36 kg/m²       PHYSICAL EXAM:  Gen: NAD, obese   HEENT: anicteric sclerae, normal conjunctiva, oropharynx clear, MM moist  Neck: supple, trachea midline, no adenopathy  Heart: RRR, no MRG, no JVD, no peripheral edema  Lungs: decreased at bases   Abd: soft, mild LLQ tenderness , ND, BS+, no organomegaly  Extr: warm  Skin: dry, no rash  Neuro: CN II-XII grossly intact, normal speech, moves all extremities  Psych: normal mood, appropriate affect    No intake or output data in the 24 hours ending 09/25/22 1443     Recent labs & imaging reviewed:  Recent Results (from the past 24 hour(s))   CULTURE, RESPIRATORY/SPUTUM/BRONCH W GRAM STAIN    Collection Time: 09/24/22  3:06 PM    Specimen: Sputum   Result Value Ref Range    Special Requests: NO SPECIAL REQUESTS      GRAM STAIN OCCASIONAL WBCS SEEN      GRAM STAIN FEW EPITHELIAL CELLS SEEN      GRAM STAIN RARE GRAM POSITIVE COCCI      GRAM STAIN RARE GRAM NEGATIVE RODS      Culture result: FEW NORMAL RESPIRATORY TAI     METABOLIC PANEL, COMPREHENSIVE    Collection Time: 09/25/22  6:57 AM   Result Value Ref Range    Sodium 141 136 - 145 mmol/L    Potassium 3.1 (L) 3.5 - 5.1 mmol/L    Chloride 115 (H) 97 - 108 mmol/L    CO2 22 21 - 32 mmol/L    Anion gap 4 (L) 5 - 15 mmol/L    Glucose 100 65 - 100 mg/dL    BUN 6 6 - 20 MG/DL    Creatinine 0.91 0.70 - 1.30 MG/DL    BUN/Creatinine ratio 7 (L) 12 - 20      GFR est AA >60 >60 ml/min/1.73m2    GFR est non-AA >60 >60 ml/min/1.73m2    Calcium 8.7 8.5 - 10.1 MG/DL    Bilirubin, total 0.4 0.2 - 1.0 MG/DL    ALT (SGPT) 13 12 - 78 U/L    AST (SGOT) 11 (L) 15 - 37 U/L    Alk.  phosphatase 63 45 - 117 U/L    Protein, total 7.4 6.4 - 8.2 g/dL Albumin 3.3 (L) 3.5 - 5.0 g/dL    Globulin 4.1 (H) 2.0 - 4.0 g/dL    A-G Ratio 0.8 (L) 1.1 - 2.2     MAGNESIUM    Collection Time: 09/25/22  6:57 AM   Result Value Ref Range    Magnesium 1.9 1.6 - 2.4 mg/dL   CBC WITH AUTOMATED DIFF    Collection Time: 09/25/22  6:57 AM   Result Value Ref Range    WBC 8.0 4.1 - 11.1 K/uL    RBC 4.57 4.10 - 5.70 M/uL    HGB 12.5 12.1 - 17.0 g/dL    HCT 38.6 36.6 - 50.3 %    MCV 84.5 80.0 - 99.0 FL    MCH 27.4 26.0 - 34.0 PG    MCHC 32.4 30.0 - 36.5 g/dL    RDW 14.5 11.5 - 14.5 %    PLATELET 849 732 - 066 K/uL    MPV 9.8 8.9 - 12.9 FL    NRBC 0.0 0  WBC    ABSOLUTE NRBC 0.00 0.00 - 0.01 K/uL    NEUTROPHILS 65 32 - 75 %    LYMPHOCYTES 25 12 - 49 %    MONOCYTES 5 5 - 13 %    EOSINOPHILS 5 0 - 7 %    BASOPHILS 0 0 - 1 %    IMMATURE GRANULOCYTES 0 0.0 - 0.5 %    ABS. NEUTROPHILS 5.2 1.8 - 8.0 K/UL    ABS. LYMPHOCYTES 2.0 0.8 - 3.5 K/UL    ABS. MONOCYTES 0.4 0.0 - 1.0 K/UL    ABS. EOSINOPHILS 0.4 0.0 - 0.4 K/UL    ABS. BASOPHILS 0.0 0.0 - 0.1 K/UL    ABS. IMM. GRANS. 0.0 0.00 - 0.04 K/UL    DF AUTOMATED     PROTHROMBIN TIME + INR    Collection Time: 09/25/22  6:57 AM   Result Value Ref Range    INR 1.7 (H) 0.9 - 1.1      Prothrombin time 17.2 (H) 9.0 - 11.1 sec   LACTIC ACID    Collection Time: 09/25/22  6:58 AM   Result Value Ref Range    Lactic acid 1.2 0.4 - 2.0 MMOL/L     Recent Labs     09/25/22  0657 09/24/22  0005   WBC 8.0 10.8   HGB 12.5 10.8*   HCT 38.6 34.2*    242     Recent Labs     09/25/22  0657 09/24/22  0005    138   K 3.1* 3.3*   * 104   CO2 22 26   BUN 6 14   CREA 0.91 1.16    123*   CA 8.7 8.7   MG 1.9  --      Recent Labs     09/25/22  0657 09/24/22  0005   ALT 13 7*   AP 63 60   TBILI 0.4 0.4   TP 7.4 7.2   ALB 3.3* 3.4*   GLOB 4.1* 3.8   LPSE  --  52*     Recent Labs     09/25/22  0657 09/24/22  0005   INR 1.7* 1.6*   PTP 17.2* 15.2*      No results for input(s): FE, TIBC, PSAT, FERR in the last 72 hours.    No results found for: FOL, RBCF   No results for input(s): PH, PCO2, PO2 in the last 72 hours. No results for input(s): CPK, CKNDX, TROIQ in the last 72 hours.     No lab exists for component: CPKMB  No results found for: CHOL, CHOLX, CHLST, CHOLV, HDL, HDLP, LDL, LDLC, DLDLP, TGLX, TRIGL, TRIGP, CHHD, CHHDX  No results found for: [facility]  Lab Results   Component Value Date/Time    Color YELLOW/STRAW 09/24/2022 04:54 AM    Appearance CLEAR 09/24/2022 04:54 AM    Specific gravity 1.015 09/24/2022 04:54 AM    pH (UA) 6.0 09/24/2022 04:54 AM    Protein Negative 09/24/2022 04:54 AM    Glucose Negative 09/24/2022 04:54 AM    Ketone Negative 09/24/2022 04:54 AM    Bilirubin Negative 09/24/2022 04:54 AM    Urobilinogen 0.2 09/24/2022 04:54 AM    Nitrites Negative 09/24/2022 04:54 AM    Leukocyte Esterase Negative 09/24/2022 04:54 AM    Epithelial cells FEW 09/24/2022 04:54 AM    Bacteria Negative 09/24/2022 04:54 AM    WBC 0-4 09/24/2022 04:54 AM    RBC 5-10 09/24/2022 04:54 AM       Med list reviewed  Current Facility-Administered Medications   Medication Dose Route Frequency    warfarin (COUMADIN) tablet 5 mg  5 mg Oral ONCE    [START ON 9/26/2022] vancomycin random level with am labs on 9/26 @ 06:00   Other ONCE    valsartan (DIOVAN) tablet 80 mg (Patient Supplied)  80 mg Oral DAILY    sodium chloride (NS) flush 5-10 mL  5-10 mL IntraVENous PRN    sodium chloride (NS) flush 5-40 mL  5-40 mL IntraVENous Q8H    sodium chloride (NS) flush 5-40 mL  5-40 mL IntraVENous PRN    acetaminophen (TYLENOL) tablet 650 mg  650 mg Oral Q6H PRN    Or    acetaminophen (TYLENOL) suppository 650 mg  650 mg Rectal Q6H PRN    polyethylene glycol (MIRALAX) packet 17 g  17 g Oral DAILY PRN    ondansetron (ZOFRAN ODT) tablet 4 mg  4 mg Oral Q8H PRN    Or    ondansetron (ZOFRAN) injection 4 mg  4 mg IntraVENous Q6H PRN    levoFLOXacin (LEVAQUIN) 750 mg in D5W IVPB  750 mg IntraVENous Q24H    metroNIDAZOLE (FLAGYL) IVPB premix 500 mg  500 mg IntraVENous Q12H pantoprazole (PROTONIX) tablet 40 mg  40 mg Oral ACB    ALPRAZolam (XANAX) tablet 0.5 mg  0.5 mg Oral QHS PRN    HYDROmorphone (DILAUDID) tablet 4 mg  4 mg Oral BID PRN    Vancomycin Per Pharmacy Dosing   Other Rx Dosing/Monitoring    vancomycin (VANCOCIN) 750 mg in 0.9% sodium chloride 250 mL ([id number])  750 mg IntraVENous Q8H    Warfarin Dosing Per Pharmacy   Other Rx Dosing/Monitoring    tiZANidine (ZANAFLEX) tablet 4 mg  4 mg Oral QID    carbidopa-levodopa (SINEMET)  mg per tablet 1 Tablet  1 Tablet Oral TID    HYDROmorphone (DILAUDID) tablet 24 mg  24 mg Oral Q12H    hydrALAZINE (APRESOLINE) 20 mg/mL injection 10 mg  10 mg IntraVENous Q6H PRN    0.9% sodium chloride infusion  75 mL/hr IntraVENous CONTINUOUS       Care Plan discussed with:  Patient/Family and Nurse    Estrella Rashid MD  Internal Medicine  Date of Service: 9/25/2022

## 2022-09-25 NOTE — PROGRESS NOTES
SPEECH PATHOLOGY BEDSIDE SWALLOW EVALUATION/DISCHARGE  Patient: Micki Castorena (32 y.o. male)  Date: 9/25/2022  Primary Diagnosis: Colitis [K52.9]  Pneumonia [J18.9]       Precautions:        ASSESSMENT :  SLP evaluation complete. Patient with no overt difficulty with PO trials and no overt s/s of aspiration. Recommend continuing full liquid diet until cleared from an abdominal standpoint. Feel confident that he will be able to tolerate a regular diet/thin liquids from an oropharyngeal standpoint when cleared. .  Skilled acute therapy provided by a speech-language pathologist is not indicated at this time. PLAN :  Recommendations:  --Regular diet/thin liquids when cleared from abdominal standpoint  --general aspiration precautions  --good oral care    Discharge Recommendations: None     SUBJECTIVE:   Patient stated, \"I think I'm fine.     OBJECTIVE:     Past Medical History:   Diagnosis Date    Acid reflux     Anxiety     Baker's cyst     DVT (deep venous thrombosis) (HCC)     Hypertension     Restless leg     Restless leg syndrome      Past Surgical History:   Procedure Laterality Date    HX ORTHOPAEDIC      multiple surgeries to his left leg    IR DILATE ESOPH STRICT      IR IVC FILTER       Prior Level of Function/Home Situation:   Home Situation  Home Environment: Private residence  # Steps to Enter: 3  One/Two Story Residence: One story  Living Alone: No  Support Systems: Spouse/Significant Other  Patient Expects to be Discharged to[de-identified] Home with family assistance  Current DME Used/Available at Home: Nebulizer  Diet prior to admission: regular diet/thin liquids  Current Diet:  full liquid diet   Cognitive and Communication Status:  Neurologic State: Alert  Orientation Level: Oriented X4  Cognition: Appropriate decision making, Appropriate for age attention/concentration, Appropriate safety awareness  Perception: Appears intact  Perseveration: No perseveration noted  Safety/Judgement: Awareness of environment  Oral Assessment:  Oral Assessment  Labial: No impairment  Dentition: Natural  Oral Hygiene: oral mucosa moist and clear of secretions  Lingual: No impairment  Velum: No impairment  Mandible: No impairment  P.O. Trials:  Patient Position: upright in bed  Vocal quality prior to P.O.: No impairment  Consistency Presented: Thin liquid  How Presented: Self-fed/presented;Straw;Successive swallows     Bolus Acceptance: No impairment  Bolus Formation/Control: No impairment     Propulsion: No impairment  Oral Residue: None  Initiation of Swallow: No impairment  Laryngeal Elevation: Functional  Aspiration Signs/Symptoms: None  Pharyngeal Phase Characteristics: No impairment, issues, or problems              Oral Phase Severity: No impairment  Pharyngeal Phase Severity : No impairment  NOMS:   The NOMS functional outcome measure was used to quantify this patient's level of swallowing impairment. Based on the NOMS, the patient was determined to be at level 7 for swallow function     NOMS Swallowing Levels:  Level 1 (CN): NPO  Level 2 (CM): NPO but takes consistency in therapy  Level 3 (CL): Takes less than 50% of nutrition p.o. and continues with nonoral feedings; and/or safe with mod cues; and/or max diet restriction  Level 4 (CK): Safe swallow but needs mod cues; and/or mod diet restriction; and/or still requires some nonoral feeding/supplements  Level 5 (CJ): Safe swallow with min diet restriction; and/or needs min cues  Level 6 (CI): Independent with p.o.; rare cues; usually self cues; may need to avoid some foods or needs extra time  Level 7 ([address] [place] [address]): Independent for all p.o.  ART. (2003). National Outcomes Measurement System (NOMS): Adult Speech-Language Pathology User's Guide.        Pain:  Pain Scale 1: Numeric (0 - 10)  Pain Intensity 1: 4     After treatment:   Call bell within reach and Nursing notified    COMMUNICATION/EDUCATION:     The patient's plan of care including recommendations, planned interventions, and recommended diet changes were discussed with: Registered nurse.      Thank you for this referral.  YOCASTA Noland  Time Calculation: 10 mins

## 2022-09-26 LAB
ANION GAP SERPL CALC-SCNC: 4 MMOL/L (ref 5–15)
BACTERIA SPEC CULT: NORMAL
BUN SERPL-MCNC: 4 MG/DL (ref 6–20)
BUN/CREAT SERPL: 5 (ref 12–20)
CALCIUM SERPL-MCNC: 8.8 MG/DL (ref 8.5–10.1)
CAMPYLOBACTER SPECIES, DNA: NEGATIVE
CHLORIDE SERPL-SCNC: 113 MMOL/L (ref 97–108)
CO2 SERPL-SCNC: 22 MMOL/L (ref 21–32)
CREAT SERPL-MCNC: 0.88 MG/DL (ref 0.7–1.3)
ENTEROTOXIGEN E COLI, DNA: NEGATIVE
GLUCOSE SERPL-MCNC: 100 MG/DL (ref 65–100)
GRAM STN SPEC: NORMAL
INR PPP: 1.8 (ref 0.9–1.1)
P SHIGELLOIDES DNA STL QL NAA+PROBE: NEGATIVE
POTASSIUM SERPL-SCNC: 3 MMOL/L (ref 3.5–5.1)
PROTHROMBIN TIME: 18.3 SEC (ref 9–11.1)
SALMONELLA SPECIES, DNA: NEGATIVE
SERVICE CMNT-IMP: NORMAL
SHIGA TOXIN PRODUCING, DNA: NEGATIVE
SHIGELLA SP+EIEC IPAH STL QL NAA+PROBE: NEGATIVE
SODIUM SERPL-SCNC: 139 MMOL/L (ref 136–145)
VANCOMYCIN SERPL-MCNC: 7.4 UG/ML
VIBRIO SPECIES, DNA: NEGATIVE
Y. ENTEROCOLITICA, DNA: NEGATIVE

## 2022-09-26 PROCEDURE — 80202 ASSAY OF VANCOMYCIN: CPT

## 2022-09-26 PROCEDURE — 74011250636 HC RX REV CODE- 250/636: Performed by: PHYSICIAN ASSISTANT

## 2022-09-26 PROCEDURE — 65270000046 HC RM TELEMETRY

## 2022-09-26 PROCEDURE — 85610 PROTHROMBIN TIME: CPT

## 2022-09-26 PROCEDURE — 74011250636 HC RX REV CODE- 250/636: Performed by: INTERNAL MEDICINE

## 2022-09-26 PROCEDURE — 74011000250 HC RX REV CODE- 250: Performed by: INTERNAL MEDICINE

## 2022-09-26 PROCEDURE — 36415 COLL VENOUS BLD VENIPUNCTURE: CPT

## 2022-09-26 PROCEDURE — 74011250636 HC RX REV CODE- 250/636: Performed by: FAMILY MEDICINE

## 2022-09-26 PROCEDURE — 74011250637 HC RX REV CODE- 250/637: Performed by: INTERNAL MEDICINE

## 2022-09-26 PROCEDURE — 93005 ELECTROCARDIOGRAM TRACING: CPT

## 2022-09-26 PROCEDURE — 80048 BASIC METABOLIC PNL TOTAL CA: CPT

## 2022-09-26 RX ORDER — POTASSIUM CHLORIDE 750 MG/1
40 TABLET, FILM COATED, EXTENDED RELEASE ORAL
Status: COMPLETED | OUTPATIENT
Start: 2022-09-26 | End: 2022-09-26

## 2022-09-26 RX ORDER — CARBIDOPA AND LEVODOPA 25; 100 MG/1; MG/1
1 TABLET ORAL DAILY
COMMUNITY

## 2022-09-26 RX ORDER — WARFARIN SODIUM 5 MG/1
10 TABLET ORAL ONCE
Status: COMPLETED | OUTPATIENT
Start: 2022-09-26 | End: 2022-09-26

## 2022-09-26 RX ORDER — CARBIDOPA AND LEVODOPA 25; 100 MG/1; MG/1
2 TABLET ORAL
Status: DISCONTINUED | OUTPATIENT
Start: 2022-09-26 | End: 2022-09-27 | Stop reason: HOSPADM

## 2022-09-26 RX ORDER — TOPIRAMATE 50 MG/1
150 TABLET, FILM COATED ORAL
COMMUNITY

## 2022-09-26 RX ORDER — HYDROMORPHONE HYDROCHLORIDE 8 MG/1
24 TABLET ORAL 2 TIMES DAILY
COMMUNITY

## 2022-09-26 RX ORDER — CARBIDOPA AND LEVODOPA 25; 100 MG/1; MG/1
1 TABLET ORAL DAILY
Status: DISCONTINUED | OUTPATIENT
Start: 2022-09-27 | End: 2022-09-27 | Stop reason: HOSPADM

## 2022-09-26 RX ORDER — WARFARIN SODIUM 5 MG/1
10 TABLET ORAL
COMMUNITY

## 2022-09-26 RX ADMIN — SODIUM CHLORIDE, PRESERVATIVE FREE 10 ML: 5 INJECTION INTRAVENOUS at 22:35

## 2022-09-26 RX ADMIN — METRONIDAZOLE 500 MG: 500 INJECTION, SOLUTION INTRAVENOUS at 01:40

## 2022-09-26 RX ADMIN — CARBIDOPA AND LEVODOPA 1 TABLET: 25; 100 TABLET ORAL at 09:21

## 2022-09-26 RX ADMIN — TIZANIDINE 4 MG: 4 TABLET ORAL at 09:22

## 2022-09-26 RX ADMIN — SODIUM CHLORIDE, PRESERVATIVE FREE 10 ML: 5 INJECTION INTRAVENOUS at 06:41

## 2022-09-26 RX ADMIN — TIZANIDINE 4 MG: 4 TABLET ORAL at 13:21

## 2022-09-26 RX ADMIN — HYDROMORPHONE HYDROCHLORIDE 24 MG: 4 TABLET ORAL at 17:58

## 2022-09-26 RX ADMIN — SODIUM CHLORIDE 75 ML/HR: 9 INJECTION, SOLUTION INTRAVENOUS at 13:28

## 2022-09-26 RX ADMIN — WARFARIN SODIUM 10 MG: 5 TABLET ORAL at 16:27

## 2022-09-26 RX ADMIN — HYDROMORPHONE HYDROCHLORIDE 24 MG: 4 TABLET ORAL at 06:38

## 2022-09-26 RX ADMIN — ALPRAZOLAM 0.5 MG: 0.5 TABLET ORAL at 04:44

## 2022-09-26 RX ADMIN — HYDRALAZINE HYDROCHLORIDE 10 MG: 20 INJECTION INTRAMUSCULAR; INTRAVENOUS at 04:06

## 2022-09-26 RX ADMIN — HYDROMORPHONE HYDROCHLORIDE 4 MG: 2 TABLET ORAL at 13:25

## 2022-09-26 RX ADMIN — SODIUM CHLORIDE, PRESERVATIVE FREE 10 ML: 5 INJECTION INTRAVENOUS at 13:37

## 2022-09-26 RX ADMIN — CARBIDOPA AND LEVODOPA 2 TABLET: 25; 100 TABLET ORAL at 22:35

## 2022-09-26 RX ADMIN — TIZANIDINE 4 MG: 4 TABLET ORAL at 22:35

## 2022-09-26 RX ADMIN — TOPIRAMATE 150 MG: 25 TABLET, FILM COATED ORAL at 22:34

## 2022-09-26 RX ADMIN — HYDRALAZINE HYDROCHLORIDE 10 MG: 20 INJECTION INTRAMUSCULAR; INTRAVENOUS at 22:40

## 2022-09-26 RX ADMIN — HYDROMORPHONE HYDROCHLORIDE 4 MG: 2 TABLET ORAL at 01:40

## 2022-09-26 RX ADMIN — PANTOPRAZOLE SODIUM 40 MG: 40 TABLET, DELAYED RELEASE ORAL at 06:38

## 2022-09-26 RX ADMIN — POTASSIUM CHLORIDE 40 MEQ: 750 TABLET, FILM COATED, EXTENDED RELEASE ORAL at 09:21

## 2022-09-26 RX ADMIN — LEVOFLOXACIN 750 MG: 5 INJECTION, SOLUTION INTRAVENOUS at 04:06

## 2022-09-26 RX ADMIN — ONDANSETRON 4 MG: 2 INJECTION INTRAMUSCULAR; INTRAVENOUS at 04:45

## 2022-09-26 RX ADMIN — VANCOMYCIN HYDROCHLORIDE 750 MG: 750 INJECTION, POWDER, LYOPHILIZED, FOR SOLUTION INTRAVENOUS at 06:40

## 2022-09-26 RX ADMIN — VALSARTAN 80 MG: 80 TABLET ORAL at 09:29

## 2022-09-26 RX ADMIN — METRONIDAZOLE 500 MG: 500 INJECTION, SOLUTION INTRAVENOUS at 13:21

## 2022-09-26 RX ADMIN — HYDRALAZINE HYDROCHLORIDE 10 MG: 20 INJECTION INTRAMUSCULAR; INTRAVENOUS at 16:27

## 2022-09-26 RX ADMIN — SODIUM CHLORIDE 75 ML/HR: 9 INJECTION, SOLUTION INTRAVENOUS at 22:35

## 2022-09-26 RX ADMIN — TIZANIDINE 4 MG: 4 TABLET ORAL at 17:58

## 2022-09-26 NOTE — PROGRESS NOTES
Bedside and Verbal shift change report given to Vern Khoi (oncoming nurse) by Tyron Massey (offgoing nurse). Report included the following information SBAR, Kardex, ED Summary, Intake/Output, MAR, Accordion, Recent Results, Med Rec Status, and Cardiac Rhythm SB to NSR .

## 2022-09-26 NOTE — PROGRESS NOTES
Problem: Falls - Risk of  Goal: *Absence of Falls  Description: Document Chalo Donahue Fall Risk and appropriate interventions in the flowsheet.   Outcome: Progressing Towards Goal  Note: Fall Risk Interventions:            Medication Interventions: Assess postural VS orthostatic hypotension, Evaluate medications/consider consulting pharmacy, Patient to call before getting OOB, Teach patient to arise slowly         History of Falls Interventions: Consult care management for discharge planning, Door open when patient unattended, Investigate reason for fall, Evaluate medications/consider consulting pharmacy         Problem: Patient Education: Go to Patient Education Activity  Goal: Patient/Family Education  Outcome: Progressing Towards Goal

## 2022-09-26 NOTE — PROGRESS NOTES
Pharmacist Note - Warfarin Dosing  Consult provided for this 62 y. o.male to manage warfarin for Hx DVT    INR Goal: 2 - 3    Home regimen/ tablet size: 10 mg MWF; 5 mg rest of week    Drugs that may increase INR: Quinolones; metronidazole  Drugs that may decrease INR: None  Other current anticoagulants/ drugs that may increase bleeding risk: None  Risk factors: None  Daily INR ordered: YES    Recent Labs     09/26/22  1113 09/25/22  0657 09/24/22  0005   HGB  --  12.5 10.8*   INR 1.8* 1.7* 1.6*     Date               INR                  Dose  9/24                1.6                  5 mg  9/25                1.7                   5 mg   9/26                1.8                   10 mg                                                                               Assessment/ Plan: Will order warfarin 10 mg PO x 1 dose as per usual home regimen (since INR has not increased as expected with metronidazole/lvq). Pharmacy will continue to monitor daily and adjust therapy as indicated. Please contact the pharmacist at x [phone] outpatient recommendations if needed.      Adrian Anderson, CarlosD, BCPS

## 2022-09-26 NOTE — PROGRESS NOTES
Bedside shift change report given to HO Champagne (oncoming nurse) by Camila Lopez RN (offgoing nurse). Report included the following information SBAR, Kardex, Intake/Output, MAR, Recent Results, and Cardiac Rhythm NSR/SB .

## 2022-09-26 NOTE — PROGRESS NOTES
Physician Progress Note      Karin Michele  CSN #:                  [id number]  :                       1965  ADMIT DATE:       2022 11:50 PM  [address] [place] DATE:  RESPONDING  PROVIDER #:        Laurie Correia MD          QUERY TEXT:    Pt admitted with pneumonia and colitis per Hospitalist. Pt noted by the ED physician also to have hypotension in the ED and increasing confusion and slurred speech prior to admission per the pt's wife. The ED Physician noted metabolic encephalopathy. The pt received IV Abx and IVF, including 2 NS boluses. There was no documentation of metabolic encephalopathy in subsequent notes. If possible, please document if you were evaluating and/or treating any of the following: The medical record reflects the following:    Risk Factors: 27-year-old male presents with 3-day history of intermittently productive cough and diarrhea with increasing reported confusion and slurred speech by his wife, history of chronic pain on large doses of PO Dilaudid at baseline and work-up shows pneumonia as well as colitis    Clinical Indicators:  -- ED Physician:  - 27-year-old male with a history of chronic pain, on high-dose Dilaudid twice daily, as well as Zanaflex, Xanax, Ambien, and carbidopa levodopa, presents to the emergency department noting a 3-day history of diarrhea, cough, nasal congestion and productive cough with brown-colored mucus.  - His wife states that he seems to be slurring his words and more confused than he normally is at baseline.  - 27-year-old male presents with URI symptoms, cough, and diarrhea and confusion. He is afebrile satting 97% on room air. Initial BP soft with systolics in the 04B. - Initial plan was to start on p.o. doxycycline and discharge but when he was reevaluated he was seen to have a drop in his blood pressure down to the 13D systolic.   He was given IV fluid bolus and his blood pressure normalized up to 101/61 with maps in the 70s. -- CT chest/abd/pelv 9/24 =  1. Patchy bilateral airspace disease is compatible with pneumonia. 2. Mildly enlarged bilateral hilar lymph nodes are likely reactive. 3. Wall thickening in the sigmoid colon suggest colitis. 4. Small hiatal hernia. 5. Punctate bilateral nonobstructing renal calculi. 6. Chronic appearing L1 compression deformity. -- CXR 9/24 = New bibasilar airspace disease may represent atelectasis or pneumonia. -- CT head 924 = No acute intracranial process. -- BP to 76/49 on 9/24    Treatment: IV Abx, IVF including 2 liter NS bolus, vitals monitoring, serial lab monitoring, imaging studies, supportive care    Thank-you,  Pablo Erazo RN, [place]  Clinical   [email or web address]  [phone]  You can also contact me via [address] [place] [address]. Options provided:  -- Metabolic encephalopathy due to pneumonia and hypotension  -- Metabolic encephalopathy due to other, please specify, Please specify metabolic cause of encephalopathy. -- Metabolic encephalopathy ruled out  -- Other - I will add my own diagnosis  -- Disagree - Not applicable / Not valid  -- Disagree - Clinically unable to determine / Unknown  -- Refer to Clinical Documentation Reviewer    PROVIDER RESPONSE TEXT:    Provider is clinically unable to determine a response to this query.     Query created by: Tyler Gambino on 9/26/2022 3:27 PM      Electronically signed by:  Navjot Mack MD 9/26/2022 3:34 PM

## 2022-09-26 NOTE — PROGRESS NOTES
Admission Medication Reconciliation:    Information obtained from:  Patient  RxQuery data available¹:  YES    Comments/Recommendations: Updated PTA meds/reviewed patient's allergies. 1)  Information obtained from patient at the bedside; reliable historian; rx query and [facility] confirmed doses/narcotic history. 2)  Medication changes (since last review): Added  - topamax 150 mg QHS    Adjusted  - ambien to 20 mg QHS PRN (doesn't take every night; also reports does not mix this was alprazolam)    -dilaudid- has 8 mg tablets- takes 24 mg in AM and 24 mg in PM (confirmed with ); also has 4 mg tablets- uses BID PRN breakthrough pain    -sinemet changed to 25/100 mg - 2 QHS; 1 AM    -tizanidine- has 4 mg tablets- reports taking 4-5 tablets QHS PRN    -warfarin- takes 5 mg Tues, Thurs, Sat, Sun; 10 mg MWF       [employer] pharmacy benefit data reflects medications filled and processed through the [employer], however   this data does NOT capture whether the medication was picked up or is currently being taken by the patient. Allergies:  Pcn [penicillins]    Significant PMH/Disease States:   Past Medical History:   Diagnosis Date    Acid reflux     Anxiety     Baker's cyst     DVT (deep venous thrombosis) ([facility])     Hypertension     Restless leg     Restless leg syndrome      Chief Complaint for this Admission:    Chief Complaint   Patient presents with    Diarrhea    Abdominal Pain     Prior to Admission Medications:   Prior to Admission Medications   Prescriptions Last Dose Informant Taking? ALPRAZolam (XANAX) 0.25 mg tablet 9/23/2022  Yes   Sig: Take 0.75 mg by mouth nightly. HYDROmorphone (DILAUDID) 4 mg tablet 9/23/2022  Yes   Sig: Take 4 mg by mouth two (2) times daily as needed (breakthrough pain). HYDROmorphone (Dilaudid) 8 mg tablet   Yes   Sig: Take 24 mg by mouth two (2) times a day. carbidopa-levodopa (SINEMET)  mg per tablet 9/23/2022  Yes   Sig: Take 2 Tablets by mouth nightly. carbidopa-levodopa (SINEMET)  mg per tablet   Yes   Sig: Take 1 Tablet by mouth daily. esomeprazole (NEXIUM) 40 mg capsule 9/23/2022  Yes   Sig: Take 40 mg by mouth daily. tiZANidine (ZANAFLEX) 4 mg capsule 9/23/2022  Yes   Sig: Take 16 mg by mouth nightly. topiramate (Topamax) 50 mg tablet   Yes   Sig: Take 150 mg by mouth nightly. valsartan (DIOVAN) 80 mg tablet 9/21/2022  Yes   Sig: Take 80 mg by mouth daily. warfarin (COUMADIN) 5 mg tablet 9/23/2022  Yes   Sig: Take 5 mg by mouth four (4) days a week. Tues, Thurs, Sat, Sun   warfarin (COUMADIN) 5 mg tablet   Yes   Sig: Take 10 mg by mouth every Monday, Wednesday, Friday. zolpidem (AMBIEN) 10 mg tablet 9/17/2022  Yes   Sig: Take 20 mg by mouth nightly as needed for Sleep. Facility-Administered Medications: None     Please contact the main inpatient pharmacy with any questions or concerns at [phone] and we will direct you to the clinical pharmacist covering this patient's care while in-house.    Steven Chen, VILMAD

## 2022-09-26 NOTE — PROGRESS NOTES
Hospitalist Progress Note      Hospital summary: Khalida Garcia is a 62 y.o. male with known history of chronic pain on Dilaudid p.o. who presents to ED for evaluation of shortness of breath, chills, productive cough with greenish-yellowish sputum for 2 3 days,  also abdominal pain left lower quadrant associated with this diarrhea up to 20 times yesterday, patient denied having any bloody stool. Per patient he did not take any antibiotic recently. In the emergency department patient was evaluated CT chest was significant for bilateral pneumonia and CT abdomen was significant for sigmoid colitis. Antibiotic were initiated, medicine was consulted for admission and further evaluation. According to patient he was not able to eat for a few days due to intractable nausea vomiting and diarrhea. The patient has history of DVT in left lower extremity after fracture and surgery, he is on Coumadin, INR is subtherapeutic 1.6. Blood culture, stool culture pending 9/23/2022      Assessment/Plan:  Acute Sigmoid Colitis  - improving   - pt presented with Intractable Nausea, vomiting and diarrhea, Abdominal pain   - no wbc  - CT abd: Wall thickening in the sigmoid colon suggest colitis. - c/w Levaquin, flagyl  - advanced to regular diet     Community Acquired Pneumonia   - CT: Patchy bilateral airspace disease is compatible with pneumonia  - rapid covid neg   - sputum cx: pending. MRSA screen neg - Vanco dced   - c/w  Levaquin   - saturating on RA      Hypokalemia - replaced      DVT LLE - c/w coumadin, subtherapeutic anticoagulation     Chronic pain syndrome- c/w home regimen dilaudid 24mg bid   Neuralgia - takes sinemet? Hypertension - c/w home regimen valsartan     Code status: Full  DVT prophylaxis:  Disposition: TBD .  Home likely tomorrow if tolerating diet   ----------------------------------------------    CC: Colitis     S: Patient is seen and examined at bedside this AM. He has been feeling nausea and abd pain last night but he insists to try solids today   Discussed with nursing      Review of Systems:  A comprehensive review of systems was negative.     O:  Visit Vitals  BP (!) 159/86 (BP 1 Location: Left upper arm, BP Patient Position: At rest)   Pulse 83   Temp 99.1 °F (37.3 °C)   Resp 14   Ht 6' 2\" (1.88 m)   Wt 120.6 kg (265 lb 14 oz)   SpO2 100%   BMI 34.14 kg/m²       PHYSICAL EXAM:  Gen: NAD, obese   HEENT: anicteric sclerae, normal conjunctiva, oropharynx clear, MM moist  Neck: supple, trachea midline, no adenopathy  Heart: RRR, no MRG, no JVD, no peripheral edema  Lungs: decreased at bases   Abd: soft, mild LLQ tenderness , ND, BS+, no organomegaly  Extr: warm  Skin: dry, no rash  Neuro: CN II-XII grossly intact, normal speech, moves all extremities  Psych: normal mood, appropriate affect    No intake or output data in the 24 hours ending 09/26/22 1457     Recent labs & imaging reviewed:  Recent Results (from the past 24 hour(s))   METABOLIC PANEL, BASIC    Collection Time: 09/26/22  5:14 AM   Result Value Ref Range    Sodium 139 136 - 145 mmol/L    Potassium 3.0 (L) 3.5 - 5.1 mmol/L    Chloride 113 (H) 97 - 108 mmol/L    CO2 22 21 - 32 mmol/L    Anion gap 4 (L) 5 - 15 mmol/L    Glucose 100 65 - 100 mg/dL    BUN 4 (L) 6 - 20 MG/DL    Creatinine 0.88 0.70 - 1.30 MG/DL    BUN/Creatinine ratio 5 (L) 12 - 20      GFR est AA >60 >60 ml/min/1.73m2    GFR est non-AA >60 >60 ml/min/1.73m2    Calcium 8.8 8.5 - 10.1 MG/DL   VANCOMYCIN, RANDOM    Collection Time: 09/26/22  5:14 AM   Result Value Ref Range    Vancomycin, random 7.4 UG/ML   PROTHROMBIN TIME + INR    Collection Time: 09/26/22 11:13 AM   Result Value Ref Range    INR 1.8 (H) 0.9 - 1.1      Prothrombin time 18.3 (H) 9.0 - 11.1 sec     Recent Labs     09/25/22  0657 09/24/22  0005   WBC 8.0 10.8   HGB 12.5 10.8*   HCT 38.6 34.2*    242       Recent Labs     09/26/22  0514 09/25/22  0657 09/24/22  0005    141 138   K 3.0* 3.1* 3.3*   * 115* 104   CO2 22 22 26   BUN 4* 6 14   CREA 0.88 0.91 1.16    100 123*   CA 8.8 8.7 8.7   MG  --  1.9  --        Recent Labs     09/25/22  0657 09/24/22  0005   ALT 13 7*   AP 63 60   TBILI 0.4 0.4   TP 7.4 7.2   ALB 3.3* 3.4*   GLOB 4.1* 3.8   LPSE  --  52*       Recent Labs     09/26/22  1113 09/25/22  0657 09/24/22  0005   INR 1.8* 1.7* 1.6*   PTP 18.3* 17.2* 15.2*        No results for input(s): FE, TIBC, PSAT, FERR in the last 72 hours. No results found for: FOL, RBCF   No results for input(s): PH, PCO2, PO2 in the last 72 hours. No results for input(s): CPK, CKNDX, TROIQ in the last 72 hours.     No lab exists for component: CPKMB  No results found for: CHOL, CHOLX, CHLST, CHOLV, HDL, HDLP, LDL, LDLC, DLDLP, TGLX, TRIGL, TRIGP, CHHD, CHHDX  No results found for: [facility]  Lab Results   Component Value Date/Time    Color YELLOW/STRAW 09/24/2022 04:54 AM    Appearance CLEAR 09/24/2022 04:54 AM    Specific gravity 1.015 09/24/2022 04:54 AM    pH (UA) 6.0 09/24/2022 04:54 AM    Protein Negative 09/24/2022 04:54 AM    Glucose Negative 09/24/2022 04:54 AM    Ketone Negative 09/24/2022 04:54 AM    Bilirubin Negative 09/24/2022 04:54 AM    Urobilinogen 0.2 09/24/2022 04:54 AM    Nitrites Negative 09/24/2022 04:54 AM    Leukocyte Esterase Negative 09/24/2022 04:54 AM    Epithelial cells FEW 09/24/2022 04:54 AM    Bacteria Negative 09/24/2022 04:54 AM    WBC 0-4 09/24/2022 04:54 AM    RBC 5-10 09/24/2022 04:54 AM       Med list reviewed  Current Facility-Administered Medications   Medication Dose Route Frequency    valsartan (DIOVAN) tablet 80 mg (Patient Supplied)  80 mg Oral DAILY    sodium chloride (NS) flush 5-10 mL  5-10 mL IntraVENous PRN    sodium chloride (NS) flush 5-40 mL  5-40 mL IntraVENous Q8H    sodium chloride (NS) flush 5-40 mL  5-40 mL IntraVENous PRN    acetaminophen (TYLENOL) tablet 650 mg  650 mg Oral Q6H PRN    Or    acetaminophen (TYLENOL) suppository 650 mg  650 mg Rectal Q6H PRN    polyethylene glycol (MIRALAX) packet 17 g  17 g Oral DAILY PRN    ondansetron (ZOFRAN ODT) tablet 4 mg  4 mg Oral Q8H PRN    Or    ondansetron (ZOFRAN) injection 4 mg  4 mg IntraVENous Q6H PRN    levoFLOXacin (LEVAQUIN) 750 mg in D5W IVPB  750 mg IntraVENous Q24H    metroNIDAZOLE (FLAGYL) IVPB premix 500 mg  500 mg IntraVENous Q12H    pantoprazole (PROTONIX) tablet 40 mg  40 mg Oral ACB    ALPRAZolam (XANAX) tablet 0.5 mg  0.5 mg Oral QHS PRN    HYDROmorphone (DILAUDID) tablet 4 mg  4 mg Oral BID PRN    Warfarin Dosing Per Pharmacy   Other Rx Dosing/Monitoring    tiZANidine (ZANAFLEX) tablet 4 mg  4 mg Oral QID    carbidopa-levodopa (SINEMET)  mg per tablet 1 Tablet  1 Tablet Oral TID    HYDROmorphone (DILAUDID) tablet 24 mg  24 mg Oral Q12H    hydrALAZINE (APRESOLINE) 20 mg/mL injection 10 mg  10 mg IntraVENous Q6H PRN    0.9% sodium chloride infusion  75 mL/hr IntraVENous CONTINUOUS       Care Plan discussed with:  Patient/Family and Nurse    Hedy Eaton MD  Internal Medicine  Date of Service: 9/26/2022

## 2022-09-26 NOTE — PROGRESS NOTES
Problem: Falls - Risk of  Goal: *Absence of Falls  Description: Document Kunal Sol Fall Risk and appropriate interventions in the flowsheet.   Outcome: Progressing Towards Goal  Note: Fall Risk Interventions:            Medication Interventions: Patient to call before getting OOB, Teach patient to arise slowly         History of Falls Interventions: Door open when patient unattended, Utilize gait belt for transfer/ambulation         Problem: Patient Education: Go to Patient Education Activity  Goal: Patient/Family Education  Outcome: Progressing Towards Goal

## 2022-09-26 NOTE — PROGRESS NOTES
Bedside and Verbal shift change report given to Mia Abrams (oncoming nurse) by Sandrita Milligan RN (offgoing nurse). Report included the following information SBAR, Kardex, and Cardiac Rhythm   .

## 2022-09-27 VITALS
OXYGEN SATURATION: 98 % | HEIGHT: 74 IN | HEART RATE: 70 BPM | BODY MASS INDEX: 34.12 KG/M2 | TEMPERATURE: 98.7 F | RESPIRATION RATE: 16 BRPM | DIASTOLIC BLOOD PRESSURE: 91 MMHG | WEIGHT: 265.88 LBS | SYSTOLIC BLOOD PRESSURE: 165 MMHG

## 2022-09-27 LAB
ANION GAP SERPL CALC-SCNC: 6 MMOL/L (ref 5–15)
ATRIAL RATE: 86 BPM
BUN SERPL-MCNC: 5 MG/DL (ref 6–20)
BUN/CREAT SERPL: 6 (ref 12–20)
CALCIUM SERPL-MCNC: 9.2 MG/DL (ref 8.5–10.1)
CALCULATED P AXIS, ECG09: 23 DEGREES
CALCULATED R AXIS, ECG10: 28 DEGREES
CALCULATED T AXIS, ECG11: 35 DEGREES
CHLORIDE SERPL-SCNC: 114 MMOL/L (ref 97–108)
CO2 SERPL-SCNC: 23 MMOL/L (ref 21–32)
CREAT SERPL-MCNC: 0.86 MG/DL (ref 0.7–1.3)
DIAGNOSIS, 93000: NORMAL
GLUCOSE SERPL-MCNC: 89 MG/DL (ref 65–100)
INR PPP: 1.9 (ref 0.9–1.1)
P-R INTERVAL, ECG05: 184 MS
POTASSIUM SERPL-SCNC: 3.5 MMOL/L (ref 3.5–5.1)
PROTHROMBIN TIME: 19.3 SEC (ref 9–11.1)
Q-T INTERVAL, ECG07: 388 MS
QRS DURATION, ECG06: 88 MS
QTC CALCULATION (BEZET), ECG08: 464 MS
SODIUM SERPL-SCNC: 143 MMOL/L (ref 136–145)
VENTRICULAR RATE, ECG03: 86 BPM

## 2022-09-27 PROCEDURE — 74011250637 HC RX REV CODE- 250/637: Performed by: INTERNAL MEDICINE

## 2022-09-27 PROCEDURE — 74011250636 HC RX REV CODE- 250/636: Performed by: PHYSICIAN ASSISTANT

## 2022-09-27 PROCEDURE — 74011250636 HC RX REV CODE- 250/636: Performed by: INTERNAL MEDICINE

## 2022-09-27 PROCEDURE — 85610 PROTHROMBIN TIME: CPT

## 2022-09-27 PROCEDURE — 36415 COLL VENOUS BLD VENIPUNCTURE: CPT

## 2022-09-27 PROCEDURE — 80048 BASIC METABOLIC PNL TOTAL CA: CPT

## 2022-09-27 PROCEDURE — 74011000250 HC RX REV CODE- 250: Performed by: INTERNAL MEDICINE

## 2022-09-27 RX ORDER — METRONIDAZOLE 500 MG/1
500 TABLET ORAL 2 TIMES DAILY
Qty: 14 TABLET | Refills: 0 | Status: SHIPPED | OUTPATIENT
Start: 2022-09-27 | End: 2022-10-04

## 2022-09-27 RX ORDER — LEVOFLOXACIN 750 MG/1
750 TABLET ORAL DAILY
Qty: 7 TABLET | Refills: 0 | Status: SHIPPED | OUTPATIENT
Start: 2022-09-27 | End: 2022-10-04

## 2022-09-27 RX ADMIN — LEVOFLOXACIN 750 MG: 5 INJECTION, SOLUTION INTRAVENOUS at 05:00

## 2022-09-27 RX ADMIN — VALSARTAN 80 MG: 80 TABLET ORAL at 08:40

## 2022-09-27 RX ADMIN — SODIUM CHLORIDE, PRESERVATIVE FREE 10 ML: 5 INJECTION INTRAVENOUS at 05:19

## 2022-09-27 RX ADMIN — METRONIDAZOLE 500 MG: 500 INJECTION, SOLUTION INTRAVENOUS at 01:29

## 2022-09-27 RX ADMIN — TIZANIDINE 4 MG: 4 TABLET ORAL at 12:04

## 2022-09-27 RX ADMIN — PANTOPRAZOLE SODIUM 40 MG: 40 TABLET, DELAYED RELEASE ORAL at 06:46

## 2022-09-27 RX ADMIN — ACETAMINOPHEN 650 MG: 325 TABLET, FILM COATED ORAL at 01:32

## 2022-09-27 RX ADMIN — HYDROMORPHONE HYDROCHLORIDE 4 MG: 2 TABLET ORAL at 12:04

## 2022-09-27 RX ADMIN — CARBIDOPA AND LEVODOPA 1 TABLET: 25; 100 TABLET ORAL at 08:39

## 2022-09-27 RX ADMIN — HYDROMORPHONE HYDROCHLORIDE 4 MG: 2 TABLET ORAL at 01:29

## 2022-09-27 RX ADMIN — TIZANIDINE 4 MG: 4 TABLET ORAL at 08:40

## 2022-09-27 RX ADMIN — Medication 1 CAPSULE: at 12:04

## 2022-09-27 RX ADMIN — HYDRALAZINE HYDROCHLORIDE 10 MG: 20 INJECTION INTRAMUSCULAR; INTRAVENOUS at 05:09

## 2022-09-27 RX ADMIN — HYDROMORPHONE HYDROCHLORIDE 24 MG: 4 TABLET ORAL at 05:09

## 2022-09-27 NOTE — PROGRESS NOTES
A Spiritual Care Partner Volunteer visited patient in Room 420 on 9/27/2022.   Documented by:  Chaplain Turner MDiv, MS, [facility]

## 2022-09-27 NOTE — PROGRESS NOTES
Physician Progress Note      Jeison Mark  CSN #:                  [id number]  :                       1965  ADMIT DATE:       2022 11:50 PM  [address] [place] DATE:        2022 2:22 PM  RESPONDING  PROVIDER #:        Hemant Aden MD          QUERY TEXT:    Pt admitted with pneumonia, noted to have received IV Levaquin and IV Vancomycin during the admission, with sputum culture results showing rare gram positive cocci and rare gram negative rods. If possible, please document if you were evaluating and/or treating any of the following:    Note: CAP and HCAP indicate where the pneumonia was acquired, not a specific type. The medical record reflects the following:    Risk Factors: 59-year-old male presents with 3-day history of intermittently productive cough and diarrhea with increasing reported confusion and slurred speech by his wife, history of chronic pain on large doses of PO Dilaudid at baseline and work-up shows pneumonia    Clinical Indicators:    -- CT chest/abd/pelv  = Patchy bilateral airspace disease is compatible with pneumonia. -- CXR  = New bibasilar airspace disease may represent atelectasis or pneumonia. -- D/C summary: Community Acquired Pneumonia    -- Sputum culture  =  Gram stain   Occasional wbcs seen  Gram stain   Few epithelial cells seen  Gram stain   Rare gram positive cocci  Gram stain   Rare gram negative rods  Culture result: Light normal respiratory savita    Treatment: IV Levaquin, IV vancomycin, imaging studies, sputum culture    Thank-you,  Annika Vergara RN, [facility]  Clinical   Camille [email or web address]. [email or web address]  [phone]  You can also contact me via [address] [place] [address].   Options provided:  -- Gram negative pneumonia  -- Gram positive pneumonia  -- MSSA pneumonia  -- Aspiration pneumonia  -- Pneumonia, unable to specify further  -- Other - I will add my own diagnosis  -- Disagree - Not applicable / Not valid  -- Disagree - Clinically unable to determine / Unknown  -- Refer to Clinical Documentation Reviewer    PROVIDER RESPONSE TEXT:    Provider is clinically unable to determine a response to this query.     Query created by: Jerrell Martinez on 9/27/2022 4:29 PM      Electronically signed by:  Ken Estes MD 9/27/2022 4:37 PM

## 2022-09-27 NOTE — DISCHARGE SUMMARY
Discharge Summary     Patient:  Birdie Murray       MRN: [id number]       YOB: 1965       Age: 62 y.o. Date of admission:  9/23/2022    Date of discharge:  9/27/2022    Primary care provider: Dr. Ishmael Ahmadi MD    Admitting provider:  Zayda Hardin MD    Discharging provider:  Ca Neumann [address].: [phone]. If unavailable, call [phone] and ask the  to page the triage hospitalist.    Consultations  IP CONSULT TO HOSPITALIST    Procedures  * No surgery found *    Discharge destination: Home. The patient is stable for discharge. Admission diagnosis  Colitis [K52.9]  Pneumonia [J18.9]    Current Discharge Medication List        START taking these medications    Details   levoFLOXacin (Levaquin) 750 mg tablet Take 1 Tablet by mouth daily for 7 days. Qty: 7 Tablet, Refills: 0  Start date: 9/27/2022, End date: 10/4/2022      metroNIDAZOLE (FlagyL) 500 mg tablet Take 1 Tablet by mouth two (2) times a day for 7 days. Qty: 14 Tablet, Refills: 0  Start date: 9/27/2022, End date: 10/4/2022      L.acid,para-B. bifidum-S.therm (RISAQUAD) 8 billion cell cap cap Take 1 Capsule by mouth daily. Qty: 7 Capsule, Refills: 0  Start date: 9/27/2022           CONTINUE these medications which have NOT CHANGED    Details   !! carbidopa-levodopa (SINEMET)  mg per tablet Take 1 Tablet by mouth daily. !! HYDROmorphone (Dilaudid) 8 mg tablet Take 24 mg by mouth two (2) times a day. topiramate (Topamax) 50 mg tablet Take 150 mg by mouth nightly. !! warfarin (COUMADIN) 5 mg tablet Take 10 mg by mouth every Monday, Wednesday, Friday. valsartan (DIOVAN) 80 mg tablet Take 80 mg by mouth daily. !! HYDROmorphone (DILAUDID) 4 mg tablet Take 4 mg by mouth two (2) times daily as needed (breakthrough pain).       zolpidem (AMBIEN) 10 mg tablet Take 20 mg by mouth nightly as needed for Sleep.      esomeprazole (NEXIUM) 40 mg capsule Take 40 mg by mouth daily. !! carbidopa-levodopa (SINEMET)  mg per tablet Take 2 Tablets by mouth nightly. tiZANidine (ZANAFLEX) 4 mg capsule Take 16 mg by mouth nightly. ALPRAZolam (XANAX) 0.25 mg tablet Take 0.75 mg by mouth nightly. !! warfarin (COUMADIN) 5 mg tablet Take 5 mg by mouth four (4) days a week. Tues, Thurs, Sat, Sun       !! - Potential duplicate medications found. Please discuss with provider. Follow-up Information       Follow up With Specialties Details Why Baljinder Loyd MD [facility] [address] [place] [id number] [phone]              Final discharge diagnoses and brief hospital course    April Ramirez is a 62 y.o. male with known history of chronic pain on Dilaudid p.o. who presents to ED for evaluation of shortness of breath, chills, productive cough with greenish-yellowish sputum for 2 3 days,  also abdominal pain left lower quadrant associated with this diarrhea up to 20 times yesterday, patient denied having any bloody stool. Per patient he did not take any antibiotic recently. In the emergency department patient was evaluated CT chest was significant for bilateral pneumonia and CT abdomen was significant for sigmoid colitis. Antibiotic were initiated, medicine was consulted for admission and further evaluation. According to patient he was not able to eat for a few days due to intractable nausea vomiting and diarrhea. The patient has history of DVT in left lower extremity after fracture and surgery, he is on Coumadin, INR is subtherapeutic 1.6. Blood culture, stool culture pending     Acute Sigmoid Colitis  - improved  - pt presented with Intractable Nausea, vomiting and diarrhea, Abdominal pain   - no wbc  - CT abd: Wall thickening in the sigmoid colon suggest colitis.   - c/w Levaquin, flagyl  - tolerated regular diet      Community Acquired Pneumonia   - CT: Patchy bilateral airspace disease is compatible with pneumonia  - rapid covid neg   - sputum cx: pending. MRSA screen neg - Vanco dced   - c/w  Levaquin   - saturating on RA        DVT LLE - c/w coumadin, subtherapeutic anticoagulation     Chronic pain syndrome- c/w home regimen dilaudid 24mg bid   Neuralgia - takes sinemet? Hypertension - c/w home regimen valsartan     Discharge recommendations:  PCP in 1 week  Complete PO abx x 7 more days   Low fiber diet x 1-2 weeks    Discharge plan explained in detail with patient. He understood and agreed     Physical examination at discharge  Visit Vitals  BP (!) 159/96 (BP 1 Location: Left upper arm, BP Patient Position: At rest)   Pulse 93   Temp 98 °F (36.7 °C)   Resp 16   Ht 6' 2\" (1.88 m)   Wt 120.6 kg (265 lb 14 oz)   SpO2 98%   BMI 34.14 kg/m²     Gen: NAD, obese   HEENT: anicteric sclerae, normal conjunctiva, oropharynx clear, MM moist  Neck: supple, trachea midline, no adenopathy  Heart: RRR, no MRG, no JVD, no peripheral edema  Lungs: decreased at bases   Abd: soft, mild LLQ tenderness , ND, BS+, no organomegaly  Extr: warm  Skin: dry, no rash  Neuro: CN II-XII grossly intact, normal speech, moves all extremities  Psych: normal mood, appropriate affect    Pertinent imaging studies:    Per EMR     Recent Labs     09/25/22  0657   WBC 8.0   HGB 12.5   HCT 38.6        Recent Labs     09/27/22 0419 09/26/22  0514 09/25/22  0657    139 141   K 3.5 3.0* 3.1*   * 113* 115*   CO2 23 22 22   BUN 5* 4* 6   CREA 0.86 0.88 0.91   GLU 89 100 100   CA 9.2 8.8 8.7   MG  --   --  1.9     Recent Labs     09/25/22  0657   AP 63   TP 7.4   ALB 3.3*   GLOB 4.1*     Recent Labs     09/27/22  0419 09/26/22  1113 09/25/22  0657   INR 1.9* 1.8* 1.7*   PTP 19.3* 18.3* 17.2*      No results for input(s): FE, TIBC, PSAT, FERR in the last 72 hours. No results for input(s): PH, PCO2, PO2 in the last 72 hours.   No results for input(s): CPK, CKMB in the last 72 hours. No lab exists for component: TROPONINI  No components found for: [place]    Chronic Diagnoses:    Problem List as of 9/27/2022 Never Reviewed            Codes Class Noted - Resolved    Colitis ICD-10-CM: K52.9  ICD-9-CM: 558.9  9/24/2022 - Present        Pneumonia ICD-10-CM: J18.9  ICD-9-CM: 642  9/24/2022 - Present           Time spent on discharge related activities today greater than 30 minutes.       Signed:  Hiram Sauceda MD                 Hospitalist, Internal Medicine      Cc: Ruben Lopez MD

## 2022-09-27 NOTE — PROGRESS NOTES
Problem: Falls - Risk of  Goal: *Absence of Falls  Description: Document Mckenzie Acuña Fall Risk and appropriate interventions in the flowsheet.   Outcome: Progressing Towards Goal  Note: Fall Risk Interventions:            Medication Interventions: Assess postural VS orthostatic hypotension         History of Falls Interventions: Consult care management for discharge planning         Problem: Patient Education: Go to Patient Education Activity  Goal: Patient/Family Education  Outcome: Progressing Towards Goal

## 2022-09-27 NOTE — PROGRESS NOTES
Transition Plan of Care  RUR 7%-Low  Disposition-discharging home with family. No skilled need identified. Family can provide transport home.

## 2022-09-27 NOTE — DISCHARGE INSTRUCTIONS
Please bring this form with you to show your primary care provider at your follow-up appointment. Primary care provider:   @[email or web address]@    Discharging provider:  Jonatan Bell MD    You have been admitted to the hospital with the following diagnoses:  Colitis [K52.9]  Pneumonia [J18.9]    FOLLOW-UP CARE RECOMMENDATIONS:    APPOINTMENTS:  Follow-up with primary care provider,  @[email or web address]@  -  Please call [unfilled] shortly after discharge to set up an appointment to be seen in  1 week        FOLLOW-UP TESTS recommended: none     PENDING TEST RESULTS:  At the time of your discharge the following test results are still pending: none  Please make sure you review these results with your outpatient follow-up provider(s). SYMPTOMS to watch for: chest pain, shortness of breath, fever, chills, nausea, vomiting, diarrhea, change in mentation, falling, weakness, bleeding. DIET/what to eat:  Regular Diet, low fiber in next 10 days     ACTIVITY:  Activity as tolerated    What to do if new or unexpected symptoms occur? If you experience any of the above symptoms (or should other concerns or questions arise after discharge) please call your primary care physician. Return to the emergency room if you cannot get hold of your doctor. It is very important that you keep your follow-up appointment(s). Please bring discharge papers, medication list (and/or medication bottles) to your follow-up appointments for review by your outpatient provider(s). Please check the list of medications and be sure it includes every medication (even non-prescription medications) that your provider wants you to take. It is important that you take the medication exactly as they are prescribed. Keep your medication in the bottles provided by the pharmacist and keep a list of the medication names, dosages, and times to be taken in your wallet. Do not take other medications without consulting your doctor.    If you have any questions about your medications or other instructions, please talk to your nurse or care provider before you leave the hospital.    I understand that if any problems occur once I am at home I am to contact my physician. These instructions were explained to me and I had the opportunity to ask questions. Colitis: Care Instructions  Your Care Instructions  Colitis is the medical term for swelling (inflammation) of the intestine. It can be caused by different things, such as an infection or loss of blood flow in the intestine. Other causes are problems like Crohn's disease or ulcerative colitis. Symptoms may include fever, diarrhea that may be bloody, or belly pain. Sometimes symptoms go away without treatment. But you may need treatment or more tests, such as blood tests or a stool test. Or you may need imaging tests like a CT scan or a colonoscopy. In some cases, the doctor may want to test a sample of tissue from the intestine. This test is called a biopsy. The doctor has checked you carefully, but problems can develop later. If you notice any problems or new symptoms, get medical treatment right away. Follow-up care is a key part of your treatment and safety. Be sure to make and go to all appointments, and call your doctor if you are having problems. It's also a good idea to know your test results and keep a list of the medicines you take. How can you care for yourself at home? Rest until you feel better. Your doctor may recommend that you eat bland foods. These include rice, dry toast or crackers, bananas, and applesauce. To prevent dehydration, drink plenty of fluids. Choose water and other clear liquids until you feel better. If you have kidney, heart, or liver disease and have to limit fluids, talk with your doctor before you increase the amount of fluids you drink. Be safe with medicines. Take your medicines exactly as prescribed. Call your doctor if you think you are having a problem with your medicine.  You will get more details on the specific medicines your doctor prescribes. When should you call for help? Call 911 anytime you think you may need emergency care. For example, call if:    You passed out (lost consciousness). Your stools are maroon or very bloody. Call your doctor now or seek immediate medical care if:    You have new or worse belly pain. You have a fever. You are vomiting. You cannot pass stools or gas. You have new or more blood in your stools. Watch closely for changes in your health, and be sure to contact your doctor if:    You have new or worse symptoms. You are losing weight. You do not get better as expected. Where can you learn more? Go to http://www.gray.com/  Enter [id number] in the search box to learn more about \"Colitis: Care Instructions. \"  Current as of: September 8, 2021               Content Version: 13.2  © 2006-2022 [employer]. Care instructions adapted under license by [employer] (which disclaims liability or warranty for this information). If you have questions about a medical condition or this instruction, always ask your healthcare professional. [place] [address] any warranty or liability for your use of this information.

## 2022-09-29 LAB
BACTERIA SPEC CULT: NORMAL
BACTERIA SPEC CULT: NORMAL
SERVICE CMNT-IMP: NORMAL
SERVICE CMNT-IMP: NORMAL

## 2022-12-23 ENCOUNTER — HOSPITAL ENCOUNTER (EMERGENCY)
Age: 57
Discharge: HOME | End: 2022-12-23
Attending: EMERGENCY MEDICINE
Payer: COMMERCIAL

## 2022-12-23 VITALS
SYSTOLIC BLOOD PRESSURE: 162 MMHG | OXYGEN SATURATION: 96 % | TEMPERATURE: 98.7 F | DIASTOLIC BLOOD PRESSURE: 121 MMHG | WEIGHT: 250 LBS | HEART RATE: 90 BPM | RESPIRATION RATE: 16 BRPM | BODY MASS INDEX: 32.1 KG/M2

## 2022-12-23 DIAGNOSIS — K22.2 ESOPHAGEAL OBSTRUCTION DUE TO FOOD IMPACTION: Primary | ICD-10-CM

## 2022-12-23 DIAGNOSIS — T18.128A ESOPHAGEAL OBSTRUCTION DUE TO FOOD IMPACTION: Primary | ICD-10-CM

## 2022-12-23 PROCEDURE — 99282 EMERGENCY DEPT VISIT SF MDM: CPT

## 2022-12-23 NOTE — ED TRIAGE NOTES
Patient arrives with c/o getting a piece of meat stuck in his throat about 1.5 hours ago. Reports not being able to swallow secretions or intake fluids. Patient able to speak in full sentences.

## 2022-12-23 NOTE — ED NOTES
Patient provided with \"fizzies\" to attempt to dislodge object in throat. Patient doesn't feel like the issue has resolved.

## 2022-12-24 NOTE — ED PROVIDER NOTES
The history is provided by the patient. Foreign Body Swallowed  The current episode started 3 to 5 hours ago. The foreign body is Food. The incident was reported. The incident was witnessed/reported by The patient. Causes for concern: was eating beef. Associated symptoms include trouble swallowing (and vomiting white foam intermittently with tightness in upper chest). Pertinent negatives include no fever. Associated medical issues include esophageal disease (previous stricture). Associated medical issues do not include prior foreign body removal. Associated medical issues comments: GERD. Past Medical History:   Diagnosis Date    Acid reflux     Anxiety     Baker's cyst     DVT (deep venous thrombosis) (HCC)     Hypertension     Restless leg     Restless leg syndrome        Past Surgical History:   Procedure Laterality Date    HX ORTHOPAEDIC      multiple surgeries to his left leg    IR DILATE ESOPH STRICT      IR IVC FILTER           History reviewed. No pertinent family history. Social History     Socioeconomic History    Marital status:      Spouse name: Joe Maxwell    Number of children: Not on file    Years of education: Not on file    Highest education level: Not on file   Occupational History    Not on file   Tobacco Use    Smoking status: Never     Passive exposure: Never    Smokeless tobacco: Never   Vaping Use    Vaping Use: Never used   Substance and Sexual Activity    Alcohol use:  Yes     Alcohol/week: 12.0 standard drinks     Types: 12 Cans of beer per week    Drug use: Never    Sexual activity: Not on file   Other Topics Concern    Not on file   Social History Narrative    Not on file     Social Determinants of Health     Financial Resource Strain: Not on file   Food Insecurity: Not on file   Transportation Needs: Not on file   Physical Activity: Not on file   Stress: Not on file   Social Connections: Not on file   Intimate Partner Violence: Not on file   Housing Stability: Not on file         ALLERGIES: Pcn [penicillins]    Review of Systems   Constitutional:  Negative for fever. HENT:  Positive for trouble swallowing (and vomiting white foam intermittently with tightness in upper chest). All other systems reviewed and are negative. Vitals:    12/23/22 1821   BP: (!) 162/121   Pulse: 90   Resp: 16   Temp: 98.7 °F (37.1 °C)   SpO2: 96%   Weight: 113.4 kg (250 lb)            Physical Exam  Vitals and nursing note reviewed. Constitutional:       General: He is not in acute distress. Appearance: He is well-developed. HENT:      Head: Normocephalic and atraumatic. Eyes:      Conjunctiva/sclera: Conjunctivae normal.   Neck:      Trachea: No tracheal deviation. Cardiovascular:      Rate and Rhythm: Normal rate and regular rhythm. Pulmonary:      Effort: Pulmonary effort is normal. No respiratory distress. Abdominal:      General: There is no distension. Musculoskeletal:         General: No deformity. Normal range of motion. Cervical back: Neck supple. Skin:     General: Skin is warm and dry. Neurological:      Mental Status: He is alert. Cranial Nerves: No cranial nerve deficit. Psychiatric:         Behavior: Behavior normal.        MDM       62 y.o. male presents with impacted foreign body in esophagus. DDx includes recurrent stricture, mass, large food bolus impaction. Saltine crackers and ginger ale was able to successfully advance what I suspect was the beef he ate in the afternoon and he is now able to swallow appropriately. Discussed soft mechanical diet and GI follow up of endoscopic evaluation.      Procedures

## 2024-03-16 ENCOUNTER — APPOINTMENT (OUTPATIENT)
Facility: HOSPITAL | Age: 59
End: 2024-03-16
Payer: MEDICARE

## 2024-03-16 ENCOUNTER — HOSPITAL ENCOUNTER (EMERGENCY)
Facility: HOSPITAL | Age: 59
Discharge: HOME | End: 2024-03-17
Attending: EMERGENCY MEDICINE
Payer: MEDICARE

## 2024-03-16 DIAGNOSIS — R19.7 DIARRHEA, UNSPECIFIED TYPE: ICD-10-CM

## 2024-03-16 DIAGNOSIS — S80.01XA CONTUSION OF RIGHT KNEE, INITIAL ENCOUNTER: Primary | ICD-10-CM

## 2024-03-16 DIAGNOSIS — R05.1 ACUTE COUGH: ICD-10-CM

## 2024-03-16 DIAGNOSIS — R41.0 EPISODIC CONFUSION: ICD-10-CM

## 2024-03-16 PROCEDURE — 71046 X-RAY EXAM CHEST 2 VIEWS: CPT

## 2024-03-16 PROCEDURE — 73562 X-RAY EXAM OF KNEE 3: CPT

## 2024-03-16 PROCEDURE — 99284 EMERGENCY DEPT VISIT MOD MDM: CPT

## 2024-03-16 RX ORDER — LOPERAMIDE HYDROCHLORIDE 2 MG/1
4 CAPSULE ORAL
Status: COMPLETED | OUTPATIENT
Start: 2024-03-16 | End: 2024-03-17

## 2024-03-16 ASSESSMENT — PAIN DESCRIPTION - ORIENTATION: ORIENTATION: RIGHT

## 2024-03-16 ASSESSMENT — PAIN - FUNCTIONAL ASSESSMENT
PAIN_FUNCTIONAL_ASSESSMENT: 0-10
PAIN_FUNCTIONAL_ASSESSMENT: ACTIVITIES ARE NOT PREVENTED

## 2024-03-16 ASSESSMENT — PAIN DESCRIPTION - FREQUENCY: FREQUENCY: INTERMITTENT

## 2024-03-16 ASSESSMENT — PAIN DESCRIPTION - DESCRIPTORS: DESCRIPTORS: ACHING

## 2024-03-16 ASSESSMENT — PAIN DESCRIPTION - ONSET: ONSET: ON-GOING

## 2024-03-16 ASSESSMENT — PAIN SCALES - GENERAL: PAINLEVEL_OUTOF10: 5

## 2024-03-16 ASSESSMENT — PAIN DESCRIPTION - LOCATION: LOCATION: KNEE

## 2024-03-16 ASSESSMENT — PAIN DESCRIPTION - PAIN TYPE: TYPE: ACUTE PAIN

## 2024-03-17 ENCOUNTER — APPOINTMENT (OUTPATIENT)
Facility: HOSPITAL | Age: 59
End: 2024-03-17
Payer: MEDICARE

## 2024-03-17 VITALS
DIASTOLIC BLOOD PRESSURE: 79 MMHG | TEMPERATURE: 98 F | WEIGHT: 253.31 LBS | HEART RATE: 60 BPM | RESPIRATION RATE: 18 BRPM | BODY MASS INDEX: 32.51 KG/M2 | HEIGHT: 74 IN | OXYGEN SATURATION: 96 % | SYSTOLIC BLOOD PRESSURE: 133 MMHG

## 2024-03-17 LAB
ALBUMIN SERPL-MCNC: 3.9 G/DL (ref 3.5–5)
ALBUMIN/GLOB SERPL: 0.9 (ref 1.1–2.2)
ALP SERPL-CCNC: 79 U/L (ref 45–117)
ALT SERPL-CCNC: 18 U/L (ref 12–78)
AMMONIA PLAS-SCNC: <10 UMOL/L
AMPHET UR QL SCN: NEGATIVE
ANION GAP SERPL CALC-SCNC: 9 MMOL/L (ref 5–15)
APPEARANCE UR: CLEAR
AST SERPL-CCNC: 15 U/L (ref 15–37)
BACTERIA URNS QL MICRO: NEGATIVE /HPF
BARBITURATES UR QL SCN: NEGATIVE
BASOPHILS # BLD: 0 K/UL (ref 0–0.1)
BASOPHILS NFR BLD: 0 % (ref 0–1)
BENZODIAZ UR QL: POSITIVE
BILIRUB SERPL-MCNC: 0.4 MG/DL (ref 0.2–1)
BILIRUB UR QL: NEGATIVE
BUN SERPL-MCNC: 17 MG/DL (ref 6–20)
BUN/CREAT SERPL: 14 (ref 12–20)
CALCIUM SERPL-MCNC: 8.6 MG/DL (ref 8.5–10.1)
CANNABINOIDS UR QL SCN: NEGATIVE
CHLORIDE SERPL-SCNC: 101 MMOL/L (ref 97–108)
CO2 SERPL-SCNC: 28 MMOL/L (ref 21–32)
COCAINE UR QL SCN: NEGATIVE
COLOR UR: YELLOW
CREAT SERPL-MCNC: 1.23 MG/DL (ref 0.7–1.3)
DIFFERENTIAL METHOD BLD: ABNORMAL
EOSINOPHIL # BLD: 0.6 K/UL (ref 0–0.4)
EOSINOPHIL NFR BLD: 7 % (ref 0–7)
EPITH CASTS URNS QL MICRO: ABNORMAL /LPF
ERYTHROCYTE [DISTWIDTH] IN BLOOD BY AUTOMATED COUNT: 14.2 % (ref 11.5–14.5)
ETHANOL SERPL-MCNC: <10 MG/DL (ref 0–0.08)
GLOBULIN SER CALC-MCNC: 4.2 G/DL (ref 2–4)
GLUCOSE SERPL-MCNC: 126 MG/DL (ref 65–100)
GLUCOSE UR STRIP.AUTO-MCNC: NEGATIVE MG/DL
HCT VFR BLD AUTO: 40.9 % (ref 36.6–50.3)
HGB BLD-MCNC: 13.1 G/DL (ref 12.1–17)
HGB UR QL STRIP: ABNORMAL
IMM GRANULOCYTES # BLD AUTO: 0 K/UL (ref 0–0.04)
IMM GRANULOCYTES NFR BLD AUTO: 0 % (ref 0–0.5)
KETONES UR QL STRIP.AUTO: NEGATIVE MG/DL
LEUKOCYTE ESTERASE UR QL STRIP.AUTO: NEGATIVE
LYMPHOCYTES # BLD: 2.4 K/UL (ref 0.8–3.5)
LYMPHOCYTES NFR BLD: 27 % (ref 12–49)
Lab: ABNORMAL
MCH RBC QN AUTO: 27 PG (ref 26–34)
MCHC RBC AUTO-ENTMCNC: 32 G/DL (ref 30–36.5)
MCV RBC AUTO: 84.2 FL (ref 80–99)
METHADONE UR QL: POSITIVE
MONOCYTES # BLD: 0.7 K/UL (ref 0–1)
MONOCYTES NFR BLD: 8 % (ref 5–13)
MUCOUS THREADS URNS QL MICRO: ABNORMAL /LPF
NEUTS SEG # BLD: 5 K/UL (ref 1.8–8)
NEUTS SEG NFR BLD: 58 % (ref 32–75)
NITRITE UR QL STRIP.AUTO: NEGATIVE
NRBC # BLD: 0 K/UL (ref 0–0.01)
NRBC BLD-RTO: 0 PER 100 WBC
OPIATES UR QL: POSITIVE
PCP UR QL: NEGATIVE
PH UR STRIP: 6 (ref 5–8)
PLATELET # BLD AUTO: 267 K/UL (ref 150–400)
PMV BLD AUTO: 9.4 FL (ref 8.9–12.9)
POTASSIUM SERPL-SCNC: 3.9 MMOL/L (ref 3.5–5.1)
PROT SERPL-MCNC: 8.1 G/DL (ref 6.4–8.2)
PROT UR STRIP-MCNC: NEGATIVE MG/DL
RBC # BLD AUTO: 4.86 M/UL (ref 4.1–5.7)
RBC #/AREA URNS HPF: ABNORMAL /HPF (ref 0–5)
SODIUM SERPL-SCNC: 138 MMOL/L (ref 136–145)
SP GR UR REFRACTOMETRY: >1.03 (ref 1–1.03)
SPECIMEN HOLD: NORMAL
UROBILINOGEN UR QL STRIP.AUTO: 0.2 EU/DL (ref 0.2–1)
WBC # BLD AUTO: 8.9 K/UL (ref 4.1–11.1)
WBC URNS QL MICRO: ABNORMAL /HPF (ref 0–4)

## 2024-03-17 PROCEDURE — 80053 COMPREHEN METABOLIC PANEL: CPT

## 2024-03-17 PROCEDURE — 82077 ASSAY SPEC XCP UR&BREATH IA: CPT

## 2024-03-17 PROCEDURE — 70450 CT HEAD/BRAIN W/O DYE: CPT

## 2024-03-17 PROCEDURE — 2580000003 HC RX 258: Performed by: EMERGENCY MEDICINE

## 2024-03-17 PROCEDURE — 82140 ASSAY OF AMMONIA: CPT

## 2024-03-17 PROCEDURE — 36415 COLL VENOUS BLD VENIPUNCTURE: CPT

## 2024-03-17 PROCEDURE — 85025 COMPLETE CBC W/AUTO DIFF WBC: CPT

## 2024-03-17 PROCEDURE — 6370000000 HC RX 637 (ALT 250 FOR IP): Performed by: EMERGENCY MEDICINE

## 2024-03-17 PROCEDURE — 81001 URINALYSIS AUTO W/SCOPE: CPT

## 2024-03-17 PROCEDURE — 80307 DRUG TEST PRSMV CHEM ANLYZR: CPT

## 2024-03-17 RX ORDER — 0.9 % SODIUM CHLORIDE 0.9 %
1000 INTRAVENOUS SOLUTION INTRAVENOUS ONCE
Status: COMPLETED | OUTPATIENT
Start: 2024-03-17 | End: 2024-03-17

## 2024-03-17 RX ORDER — LOPERAMIDE HYDROCHLORIDE 2 MG/1
2 TABLET ORAL 4 TIMES DAILY PRN
Qty: 20 TABLET | Refills: 0 | Status: SHIPPED | OUTPATIENT
Start: 2024-03-17 | End: 2024-03-27

## 2024-03-17 RX ORDER — ACETAMINOPHEN 500 MG
1000 TABLET ORAL EVERY 6 HOURS PRN
Qty: 40 TABLET | Refills: 0 | Status: SHIPPED | OUTPATIENT
Start: 2024-03-17

## 2024-03-17 RX ADMIN — LOPERAMIDE HYDROCHLORIDE 4 MG: 2 CAPSULE ORAL at 00:34

## 2024-03-17 RX ADMIN — SODIUM CHLORIDE 1000 ML: 9 INJECTION, SOLUTION INTRAVENOUS at 00:34

## 2024-03-17 RX ADMIN — ALUMINUM HYDROXIDE AND MAGNESIUM HYDROXIDE 30 ML: 200; 200 SUSPENSION ORAL at 00:35

## 2024-03-17 NOTE — ED TRIAGE NOTES
[place] [facility] Nursing Note        Patient Name: Olayinka Pelletier      : 1965             MRN: [id number]      Chief Complaint:  Fall, Knee Pain, and Altered Mental Status      Admit Diagnosis: No admission diagnoses are documented for this encounter.      Admitting Provider: No admitting provider for patient encounter.      Surgery: * No surgery found *           Patient arrived to the ER ambulatory from home limping with complaints of Falling a week ago and the Right Knee got twisted and complain of Pain. Pt did not his head, no LOC, pt is on Coumadin for a Hx of VT. Hx of COPD also & wife is concerned for PNU.         Lines:        Signed by: Ramon Bauman RN, ADRI, BSN, CMSRN                                              3/16/2024 at 9:37 PM

## 2024-03-18 NOTE — ED PROVIDER NOTES
radiographic images, CT and ultrasound are visualized and preliminarily interpreted by the emergency physician as documented in clinical course.    Interpretation per the Radiologist below, if available at the time of this note:    CT HEAD WO CONTRAST   Final Result   No acute intracranial process seen   Extensive sinus disease            XR KNEE RIGHT (3 VIEWS)   Final Result   Nonspecific soft tissue swelling.      XR CHEST (2 VW)   Final Result      No acute process seen.              LABS:  Labs Reviewed   CBC WITH AUTO DIFFERENTIAL - Abnormal; Notable for the following components:       Result Value    Eosinophils Absolute 0.6 (*)     All other components within normal limits   COMPREHENSIVE METABOLIC PANEL - Abnormal; Notable for the following components:    Glucose 126 (*)     Globulin 4.2 (*)     Albumin/Globulin Ratio 0.9 (*)     All other components within normal limits   URINALYSIS WITH MICROSCOPIC - Abnormal; Notable for the following components:    Specific Gravity, UA >1.030 (*)     Blood, Urine MODERATE (*)     Mucus, UA TRACE (*)     All other components within normal limits   URINE DRUG SCREEN - Abnormal; Notable for the following components:    Benzodiazepines, Urine Positive (*)     Methadone, Urine Positive (*)     Opiates, Urine Positive (*)     All other components within normal limits   URINE CULTURE HOLD SAMPLE   AMMONIA   ETHANOL       All other labs were within normal range or not returned as of this dictation.    EMERGENCY DEPARTMENT COURSE and DIFFERENTIAL DIAGNOSIS/MDM:   Vitals:    Vitals:    03/16/24 2200 03/16/24 2215 03/17/24 0300 03/17/24 0315   BP: 104/63 102/67 133/79    Pulse:       Resp:       Temp:   98 °F (36.7 °C)    TempSrc:   Tympanic    SpO2: 94% 95% 97% 96%   Weight:       Height:               Medical Decision Making  58-year-old male presents with right knee pain after a fall a week ago.  There is no sign of bony injury on x-ray.  He has had cough today and now has